# Patient Record
Sex: MALE | Race: WHITE | NOT HISPANIC OR LATINO | Employment: FULL TIME | URBAN - METROPOLITAN AREA
[De-identification: names, ages, dates, MRNs, and addresses within clinical notes are randomized per-mention and may not be internally consistent; named-entity substitution may affect disease eponyms.]

---

## 2018-01-10 ENCOUNTER — OFFICE VISIT (OUTPATIENT)
Dept: UROLOGY | Facility: CLINIC | Age: 48
End: 2018-01-10
Payer: COMMERCIAL

## 2018-01-10 VITALS
SYSTOLIC BLOOD PRESSURE: 155 MMHG | WEIGHT: 249 LBS | BODY MASS INDEX: 31.95 KG/M2 | HEART RATE: 67 BPM | DIASTOLIC BLOOD PRESSURE: 95 MMHG | HEIGHT: 74 IN

## 2018-01-10 DIAGNOSIS — I86.1 VARICOCELE: ICD-10-CM

## 2018-01-10 DIAGNOSIS — E29.1 TESTICULAR FAILURE: Primary | ICD-10-CM

## 2018-01-10 PROCEDURE — 99204 OFFICE O/P NEW MOD 45 MIN: CPT | Mod: S$GLB,,, | Performed by: UROLOGY

## 2018-01-10 PROCEDURE — 99999 PR PBB SHADOW E&M-EST. PATIENT-LVL III: CPT | Mod: PBBFAC,,, | Performed by: UROLOGY

## 2018-01-10 NOTE — PROGRESS NOTES
"Chief Complaint:  Infertility    HPI:    Mr. Burr is a 47 y.o.  male who just broke up with his girlfriend, but he now questions his fertility potential. 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 He denies a history of erectile dysfunction and ejaculatory problems.    SA 10/5/17--1.7 cc/15.5 million per cc/61%/2%    He has achieved 1 pregnancies in the past.    Jose Luis Burr denies a history of exposure to harmful toxins, and radiation.  Questionable chemical exposure.    No history of recent fevers greater than 101.5 degrees Farenheit.    + history of recent exposure to "wet heat." hot tub 1 month ago.    No history of urological trauma or testicular torsion.    No history of prostatitis, epididymitis, and orchitis.    No history of post-pubertal mumps.    There is no known family history of fertility problems.    REVIEW OF SYSTEMS:     He denies headache, blurred vision, fever, nausea, vomiting, chills, flank discomfort, abdominal pain, bleeding per rectum, cough, SOB, recent loss of consciousness, recent mental status changes, seizures, dizziness, or upper/lower extremity weakness.    PHYSICAL EXAM:     The patient generally appears in good health, is appropriately interactive, and is in no apparent distress.     Eyes: anicteric sclerae, moist conjunctivae; no lid-lag; PERRLA     HENT: Atraumatic; oropharynx clear with moist mucous membranes and no mucosal ulcerations;normal hard and soft palate.  No evidence of lymphadenopathy.    Neck: Trachea midline.  No thyromegaly.    Musculoskeletal: No abnormal gait.    Skin: No lesions.    Mental: Cooperative with normal affect.  Is oriented to time, place, and person.    Neuro: Grossly intact.    Chest: Normal inspiratory effort.   No accessory muscles.  No audible wheezes.  Respirations symmetric on inspiration and expiration.    Heart: Regular rhythm.      Abdomen:  Soft, non-tender. No masses or organomegaly. Bladder is not " "palpable. No evidence of flank discomfort. No evidence of inguinal hernia.    Genitourinary: Penis is normal with no evidence of plaques or induration. Urethral meatus is normal. Scrotum is normal. Testes are descended bilaterally with no evidence of abnormal masses or tenderness. Epididymis, vas deferens, and cord structures are normal bilaterally.  Testicular volume is approximately 20 cc on the R and 18 cc on the L.  He has a L grade II varicocele.    Extremities: No cyanosis, clubbing, or edema.    IMPRESSION & PLAN:    Mr. Burr is a 47 y.o.  male who just broke up with his girlfriend, but he now questions his fertility potential. 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 He denies a history of erectile dysfunction and ejaculatory problems.     10/5/17--1.7 cc/15.5 million per cc/61%/2%    L grade II varicocele.    1.  FSH, LH, testosterone, prolactin, and estradiol serum levels today.  2.  Semen analysis x 1 more.  3.  Return to the clinic in 3 weeks to discuss test results and treatment plan.  4.  Recommend avoiding "wet heat."  5.  Recommend taking a multivitamin and 500 mg of vitamin c daily in addition to the multivitamin.  6.  Please send a copy of the note to Dr. Tuttle.  Thank you for the consultation.  7. Discussed varicocele's potential impact on fertility.     CC: Marry    "

## 2018-01-10 NOTE — LETTER
January 10, 2018      Taty Tuttle MD  [address] [facility]  [address]  [facility] [address]           [facility] - Urology 4th Floor  [address]  [place] [address]  Phone: [phone]          Patient: Jose Luis Burr   MR Number: [id number]   YOB: 1970   Date of Visit: 1/10/2018       Dear Dr. Taty Tuttle:    Thank you for referring Jose Luis Burr to me for evaluation. Attached you will find relevant portions of my assessment and plan of care.    If you have questions, please do not hesitate to call me. I look forward to following Jose Luis Burr along with you.    Sincerely,    Diego Arnett MD    Enclosure  CC:  No Recipients    If you would like to receive this communication electronically, please contact [email or web address] or [phone] to request more information on [email or web address] Link access.    For providers and/or their staff who would like to refer a patient to [facility], please contact us through our one-stop-shop provider referral line, [facility], at [phone].    If you feel you have received this communication in error or would no longer like to receive these types of communications, please e-mail [email or web address]

## 2018-01-10 NOTE — PATIENT INSTRUCTIONS
VARICOCELE    A varicocele is a swelling in the veins above the testicles. It is similar to a varicose vein in the legs. The swelling occurs when too much blood collects in the veins. It most often occurs around the left testicle. A varicocele may cause bluish discoloration of the scrotum (the sac of skin covering the testicles).  In most cases, a varicocele is not serious and does not require treatment. However, it can contribute to infertility. If you and your partner are trying to conceive, talk to your doctor about your options.    TREATMENT  Medications  There are no medications that correct this condition.  Surgery  Surgery can be done to close off the enlarged veins. Varicocele is not serious. And all surgery has risks. So you and your doctor may consider surgery only if: .  · The testicle shrinks (atrophy).  · You want to try to improve your fertility.  FOLLOW UP as advised by the doctor or our staff. Schedule regular exams with your doctor so the varicocele can be monitored. Be sure to keep all your appointments. Tell your doctor if there are any changes in your testicles or scrotum.   GET PROMPT MEDICAL ATTENTION if any of the following occurs:  · Increasing pain in the scrotum  · Trouble urinating  · A lump in the testicle  · A bulge in the groin area appears or is enlarging  © [id number] [facility], [address] [place] [address], [place] [address]. All rights reserved. This information is not intended as a substitute for professional medical care. Always follow your healthcare professional's instructions.

## 2018-01-11 ENCOUNTER — LAB VISIT (OUTPATIENT)
Dept: LAB | Facility: HOSPITAL | Age: 48
End: 2018-01-11
Attending: UROLOGY
Payer: COMMERCIAL

## 2018-01-11 DIAGNOSIS — E29.1 TESTICULAR FAILURE: ICD-10-CM

## 2018-01-11 LAB
ESTRADIOL SERPL-MCNC: 25 PG/ML
FSH SERPL-ACNC: 3.4 MIU/ML
LH SERPL-ACNC: 3 MIU/ML
PROLACTIN SERPL IA-MCNC: 6.2 NG/ML
TESTOST SERPL-MCNC: 459 NG/DL

## 2018-01-11 PROCEDURE — 84403 ASSAY OF TOTAL TESTOSTERONE: CPT

## 2018-01-11 PROCEDURE — 82670 ASSAY OF TOTAL ESTRADIOL: CPT

## 2018-01-11 PROCEDURE — 83001 ASSAY OF GONADOTROPIN (FSH): CPT

## 2018-01-11 PROCEDURE — 84146 ASSAY OF PROLACTIN: CPT

## 2018-01-11 PROCEDURE — 83002 ASSAY OF GONADOTROPIN (LH): CPT

## 2018-01-11 PROCEDURE — 36415 COLL VENOUS BLD VENIPUNCTURE: CPT

## 2018-03-28 ENCOUNTER — OFFICE VISIT (OUTPATIENT)
Dept: UROLOGY | Facility: CLINIC | Age: 48
End: 2018-03-28
Payer: COMMERCIAL

## 2018-03-28 ENCOUNTER — TELEPHONE (OUTPATIENT)
Dept: UROLOGY | Facility: CLINIC | Age: 48
End: 2018-03-28

## 2018-03-28 ENCOUNTER — OFFICE VISIT (OUTPATIENT)
Dept: INTERNAL MEDICINE | Facility: CLINIC | Age: 48
End: 2018-03-28
Payer: COMMERCIAL

## 2018-03-28 VITALS
SYSTOLIC BLOOD PRESSURE: 140 MMHG | DIASTOLIC BLOOD PRESSURE: 94 MMHG | BODY MASS INDEX: 32.73 KG/M2 | WEIGHT: 255.06 LBS | HEART RATE: 66 BPM | HEIGHT: 74 IN

## 2018-03-28 VITALS
SYSTOLIC BLOOD PRESSURE: 145 MMHG | WEIGHT: 253.06 LBS | DIASTOLIC BLOOD PRESSURE: 81 MMHG | HEIGHT: 74 IN | BODY MASS INDEX: 32.48 KG/M2 | HEART RATE: 58 BPM

## 2018-03-28 DIAGNOSIS — K57.91 DIVERTICULOSIS OF INTESTINE WITH BLEEDING, UNSPECIFIED INTESTINAL TRACT LOCATION: Primary | ICD-10-CM

## 2018-03-28 DIAGNOSIS — I10 HYPERTENSION, UNSPECIFIED TYPE: ICD-10-CM

## 2018-03-28 DIAGNOSIS — I86.1 VARICOCELE: ICD-10-CM

## 2018-03-28 DIAGNOSIS — Z00.00 HEALTHCARE MAINTENANCE: ICD-10-CM

## 2018-03-28 DIAGNOSIS — E29.1 TESTICULAR FAILURE: Primary | ICD-10-CM

## 2018-03-28 DIAGNOSIS — I86.1 VARICOCELE: Primary | ICD-10-CM

## 2018-03-28 PROCEDURE — 3077F SYST BP >= 140 MM HG: CPT | Mod: CPTII,S$GLB,, | Performed by: STUDENT IN AN ORGANIZED HEALTH CARE EDUCATION/TRAINING PROGRAM

## 2018-03-28 PROCEDURE — 3078F DIAST BP <80 MM HG: CPT | Mod: CPTII,S$GLB,, | Performed by: STUDENT IN AN ORGANIZED HEALTH CARE EDUCATION/TRAINING PROGRAM

## 2018-03-28 PROCEDURE — 99999 PR PBB SHADOW E&M-EST. PATIENT-LVL III: CPT | Mod: PBBFAC,,, | Performed by: UROLOGY

## 2018-03-28 PROCEDURE — 99214 OFFICE O/P EST MOD 30 MIN: CPT | Mod: S$GLB,,, | Performed by: UROLOGY

## 2018-03-28 PROCEDURE — 99203 OFFICE O/P NEW LOW 30 MIN: CPT | Mod: S$GLB,,, | Performed by: STUDENT IN AN ORGANIZED HEALTH CARE EDUCATION/TRAINING PROGRAM

## 2018-03-28 PROCEDURE — 99999 PR PBB SHADOW E&M-EST. PATIENT-LVL III: CPT | Mod: PBBFAC,,, | Performed by: STUDENT IN AN ORGANIZED HEALTH CARE EDUCATION/TRAINING PROGRAM

## 2018-03-28 NOTE — PROGRESS NOTES
"Internal Medicine Clinic Note  03/28/2018    Subjective:       Patient ID: Jose Luis Burr is a 47 y.o. male being seen to establish care    Chief Complaint: Establish Care and Rectal Bleeding    HPI   Mr. Burr is a 46 y/o M who presents to establish care and address following problems:   1. Blood in stools - started this January. Pt reports intermittent episodes of bright red blood in stool. He denies melenic stools. Denies abdominal pain but has LLQ "discomfort" that can be exacerbated by particular foods, such as vinegar. Denies any NSAID use. Denies any weight loss, change in appetite, blood in urine etc.   He has a high fiber diet and denies straining with BM. Reports adequate 1-2 BM per day without significant change in consistency. He reports adding probiotics in his diet since last Saturday has helped. Pt believes these episodes are due to oregano oil ingestion in January.    2. Elevated BP - reports BP readings 130-140s at home. He eats well including a vegan diet with almond milk, lots of fruits and vegetables. Denies adherence to low-salt however. He used to exercise but has not in the last few months and has gained ~ 10 pounds.    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   HENT: Negative for congestion, hearing loss, sore throat and tinnitus.    Eyes: Negative for blurred vision and double vision.   Respiratory: Negative for cough, sputum production, shortness of breath and wheezing.    Cardiovascular: " "Negative for chest pain, palpitations, claudication and leg swelling.   Gastrointestinal: Positive for abdominal pain (LLQ discomfort) and blood in stool. Negative for constipation, diarrhea, heartburn, melena, nausea and vomiting.   Genitourinary: Negative for dysuria, frequency, hematuria and urgency.   Musculoskeletal: Negative for back pain, joint pain and myalgias.   Skin: Negative for rash.   Neurological: Negative for dizziness, tingling, weakness and headaches.   Psychiatric/Behavioral: Negative for depression. The patient is not nervous/anxious.      Objective:      BP (!) 140/94 (BP Location: Right arm, Patient Position: Sitting, BP Method: Large (Automatic))   Pulse 66   Ht 6' 2" (1.88 m)   Wt 115.7 kg (255 lb 1.2 oz)   BMI 32.75 kg/m²   Body mass index is 32.75 kg/m².    Physical Exam   Constitutional: He is oriented to person, place, and time. He appears well-developed and well-nourished.   HENT:   Head: Normocephalic and atraumatic.   Mouth/Throat: Oropharynx is clear and moist.   Eyes: EOM are normal. Pupils are equal, round, and reactive to light.   Neck: Normal range of motion. Neck supple.   Cardiovascular: Normal rate, regular rhythm, normal heart sounds and intact distal pulses.  Exam reveals no gallop and no friction rub.    No murmur heard.  Pulmonary/Chest: Effort normal and breath sounds normal. No respiratory distress. He has no wheezes. He has no rales. He exhibits no tenderness.   Abdominal: Soft. Bowel sounds are normal. He exhibits no distension. There is no tenderness (no TTP in any quadrants).   BAILEY negative for occult blood  No external hemorrhoids visible   Musculoskeletal: Normal range of motion.   Neurological: He is alert and oriented to person, place, and time.   Skin: Skin is warm and dry.   Psychiatric: He has a normal mood and affect.   Nursing note and vitals reviewed.      Assessment:         1. Diverticulosis of intestine with bleeding, unspecified intestinal tract " location  CBC auto differential    Comprehensive metabolic panel    Lipid panel    Case request GI: COLONOSCOPY    Ambulatory consult to Gastroenterology   2. Hypertension, unspecified type     3. Healthcare maintenance           Plan:         Jose Luis Burr is a 47 y.o. male being seen to establish care. He also complains of blood in stool. BAILEY was negative and there was no visible external hemorrhoids. Have high suspicion for diverticulosis given risk factors (sedentary, obesity, questionable family history (mom)) & associated LLQ discomfort.    1. Diverticulosis of intestine with bleeding, unspecified intestinal tract location  - CBC auto differential; Future  - Comprehensive metabolic panel; Future  - Case request GI: COLONOSCOPY  - Ambulatory consult to Gastroenterology  - Gave info on foods to avoid with diverticulosis (especially nuts, which pt eats a lot of!)    2. HTN  - DASH diet information given  - encouraged pt to implement lifestyle changes (diet, exercise and weight loss).   - will follow up in 3 months, and if BP still elevated, will start first-line agent    3. Healthcare maintenance  - Lipid panel; Future    RTC in 3 months. Address HTN    Patient seen and plan of care discussed with Dr. Damián Pereira MD  Internal Medicine, PGY-2  [phone]

## 2018-03-28 NOTE — PROGRESS NOTES
"Chief Complaint:  Infertility    HPI:    Mr. Burr is a 47 y.o.  male who just broke up with his girlfriend, but he now questions his fertility potential. Jose Luis Burr has undergone a semen analysis x 2 showing oligoteratospermia. He denies a history of erectile dysfunction and ejaculatory problems.    Lab Results   Component Value Date    TOTALTESTOST 459 01/11/2018    LABLH 3.0 01/11/2018    FSH 3.40 01/11/2018    ESTRADIOL 25 01/11/2018    PROLACTIN 6.2 01/11/2018      SA 10/5/17--1.7 cc/15.5 million per cc/61%/2%  SA 2/21/18--2.2 cc/6.5 million per cc/54%/1%    FOR REVIEW FROM PREVIOUS:    Mr. Burr is a 47 y.o.  male who just broke up with his girlfriend, but he now questions his fertility potential. 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 He denies a history of erectile dysfunction and ejaculatory problems.    SA 10/5/17--1.7 cc/15.5 million per cc/61%/2%    He has achieved 1 pregnancies in the past.    Jose Luis Burr denies a history of exposure to harmful toxins, and radiation.  Questionable chemical exposure.    No history of recent fevers greater than 101.5 degrees Farenheit.    + history of recent exposure to "wet heat." hot tub 1 month ago.    No history of urological trauma or testicular torsion.    No history of prostatitis, epididymitis, and orchitis.    No history of post-pubertal mumps.    There is no known family history of fertility problems.    REVIEW OF SYSTEMS:     He denies headache, blurred vision, fever, nausea, vomiting, chills, flank discomfort, abdominal pain, bleeding per rectum, cough, SOB, recent loss of consciousness, recent mental status changes, seizures, dizziness, or upper/lower extremity weakness.    PHYSICAL EXAM:     The patient generally appears in good health, is appropriately interactive, and is in no apparent distress.     Eyes: anicteric sclerae, moist conjunctivae; no lid-lag; PERRLA     HENT: Atraumatic; oropharynx clear with " moist mucous membranes and no mucosal ulcerations;normal hard and soft palate.  No evidence of lymphadenopathy.    Neck: Trachea midline.  No thyromegaly.    Musculoskeletal: No abnormal gait.    Skin: No lesions.    Mental: Cooperative with normal affect.  Is oriented to time, place, and person.    Neuro: Grossly intact.    Chest: Normal inspiratory effort.   No accessory muscles.  No audible wheezes.  Respirations symmetric on inspiration and expiration.    Heart: Regular rhythm.      Abdomen:  Soft, non-tender. No masses or organomegaly. Bladder is not palpable. No evidence of flank discomfort. No evidence of inguinal hernia.    Genitourinary: Penis is normal with no evidence of plaques or induration. Urethral meatus is normal. Scrotum is normal. Testes are descended bilaterally with no evidence of abnormal masses or tenderness. Epididymis, vas deferens, and cord structures are normal bilaterally.  Testicular volume is approximately 20 cc on the R and 18 cc on the L.  He has a L grade II varicocele.    Extremities: No cyanosis, clubbing, or edema.    IMPRESSION & PLAN:    Mr. Burr is a 47 y.o.  male who just broke up with his girlfriend, but he now questions his fertility potential. Jose Luis Burr has undergone a semen analysis x 2 showing oligoteratospermia. He denies a history of erectile dysfunction and ejaculatory problems.    Lab Results   Component Value Date    TOTALTESTOST 459 01/11/2018    LABLH 3.0 01/11/2018    FSH 3.40 01/11/2018    ESTRADIOL 25 01/11/2018    PROLACTIN 6.2 01/11/2018      SA 10/5/17--1.7 cc/15.5 million per cc/61%/2%  SA 2/21/18--2.2 cc/6.5 million per cc/54%/1%    L grade II varicocele.    1. Went over the results of his SA and hormonal panel today.  2. Discussed varicocele's potential impact on fertility.  Recommend correction. We discussed the risks and benefits of varicocele repair.  We specifically addressed the chance of failure to improve semen parameters, bleeding,  "infection, pain, loss of testicle, damage to the vas.  We discussed this amongst other risks and discussed alternatives.  He was given the chance to ask questions.  Will schedule for elective varicocele repair.    3.  Recommend avoiding "wet heat."  4.  Recommend taking a multivitamin and 500 mg of vitamin c daily in addition to the multivitamin.      CC:     "

## 2018-03-30 ENCOUNTER — PATIENT MESSAGE (OUTPATIENT)
Dept: INTERNAL MEDICINE | Facility: CLINIC | Age: 48
End: 2018-03-30

## 2018-04-03 DIAGNOSIS — Z12.11 SPECIAL SCREENING FOR MALIGNANT NEOPLASMS, COLON: Primary | ICD-10-CM

## 2018-04-03 RX ORDER — SODIUM, POTASSIUM,MAG SULFATES 17.5-3.13G
SOLUTION, RECONSTITUTED, ORAL ORAL
Qty: 1 BOTTLE | Refills: 0 | Status: ON HOLD | OUTPATIENT
Start: 2018-04-03 | End: 2018-04-09 | Stop reason: HOSPADM

## 2018-04-09 ENCOUNTER — SURGERY (OUTPATIENT)
Age: 48
End: 2018-04-09

## 2018-04-09 ENCOUNTER — HOSPITAL ENCOUNTER (OUTPATIENT)
Facility: HOSPITAL | Age: 48
Discharge: HOME | End: 2018-04-09
Attending: COLON & RECTAL SURGERY | Admitting: COLON & RECTAL SURGERY
Payer: COMMERCIAL

## 2018-04-09 ENCOUNTER — ANESTHESIA EVENT (OUTPATIENT)
Dept: ENDOSCOPY | Facility: HOSPITAL | Age: 48
End: 2018-04-09
Payer: COMMERCIAL

## 2018-04-09 ENCOUNTER — ANESTHESIA (OUTPATIENT)
Dept: ENDOSCOPY | Facility: HOSPITAL | Age: 48
End: 2018-04-09
Payer: COMMERCIAL

## 2018-04-09 VITALS
HEART RATE: 67 BPM | DIASTOLIC BLOOD PRESSURE: 85 MMHG | RESPIRATION RATE: 20 BRPM | WEIGHT: 245 LBS | BODY MASS INDEX: 31.44 KG/M2 | TEMPERATURE: 98 F | SYSTOLIC BLOOD PRESSURE: 144 MMHG | HEIGHT: 74 IN | OXYGEN SATURATION: 95 %

## 2018-04-09 DIAGNOSIS — Z12.11 SCREEN FOR COLON CANCER: ICD-10-CM

## 2018-04-09 PROCEDURE — 88305 TISSUE EXAM BY PATHOLOGIST: CPT | Performed by: PATHOLOGY

## 2018-04-09 PROCEDURE — D9220A PRA ANESTHESIA: Mod: CRNA,,, | Performed by: NURSE ANESTHETIST, CERTIFIED REGISTERED

## 2018-04-09 PROCEDURE — 63600175 PHARM REV CODE 636 W HCPCS: Performed by: NURSE ANESTHETIST, CERTIFIED REGISTERED

## 2018-04-09 PROCEDURE — 37000008 HC ANESTHESIA 1ST 15 MINUTES: Performed by: COLON & RECTAL SURGERY

## 2018-04-09 PROCEDURE — 45380 COLONOSCOPY AND BIOPSY: CPT | Performed by: COLON & RECTAL SURGERY

## 2018-04-09 PROCEDURE — 27201012 HC FORCEPS, HOT/COLD, DISP: Performed by: COLON & RECTAL SURGERY

## 2018-04-09 PROCEDURE — D9220A PRA ANESTHESIA: Mod: ANES,,, | Performed by: ANESTHESIOLOGY

## 2018-04-09 PROCEDURE — 37000009 HC ANESTHESIA EA ADD 15 MINS: Performed by: COLON & RECTAL SURGERY

## 2018-04-09 PROCEDURE — 45380 COLONOSCOPY AND BIOPSY: CPT | Mod: ,,, | Performed by: COLON & RECTAL SURGERY

## 2018-04-09 PROCEDURE — 25000003 PHARM REV CODE 250: Performed by: COLON & RECTAL SURGERY

## 2018-04-09 PROCEDURE — 88305 TISSUE EXAM BY PATHOLOGIST: CPT | Mod: 26,,, | Performed by: PATHOLOGY

## 2018-04-09 RX ORDER — PROPOFOL 10 MG/ML
VIAL (ML) INTRAVENOUS CONTINUOUS PRN
Status: DISCONTINUED | OUTPATIENT
Start: 2018-04-09 | End: 2018-04-09

## 2018-04-09 RX ORDER — LIDOCAINE HCL/PF 100 MG/5ML
SYRINGE (ML) INTRAVENOUS
Status: DISCONTINUED | OUTPATIENT
Start: 2018-04-09 | End: 2018-04-09

## 2018-04-09 RX ORDER — SODIUM CHLORIDE 9 MG/ML
INJECTION, SOLUTION INTRAVENOUS CONTINUOUS
Status: DISCONTINUED | OUTPATIENT
Start: 2018-04-09 | End: 2018-04-09 | Stop reason: HOSPADM

## 2018-04-09 RX ORDER — MESALAMINE 4 G/60ML
4 SUSPENSION RECTAL NIGHTLY
Qty: 1800 ML | Refills: 11 | Status: SHIPPED | OUTPATIENT
Start: 2018-04-09 | End: 2018-07-18

## 2018-04-09 RX ORDER — PROPOFOL 10 MG/ML
VIAL (ML) INTRAVENOUS
Status: DISCONTINUED | OUTPATIENT
Start: 2018-04-09 | End: 2018-04-09

## 2018-04-09 RX ADMIN — PROPOFOL 100 MG: 10 INJECTION, EMULSION INTRAVENOUS at 02:04

## 2018-04-09 RX ADMIN — SODIUM CHLORIDE: 9 INJECTION, SOLUTION INTRAVENOUS at 01:04

## 2018-04-09 RX ADMIN — LIDOCAINE HYDROCHLORIDE 40 MG: 20 INJECTION, SOLUTION INTRAVENOUS at 02:04

## 2018-04-09 RX ADMIN — PROPOFOL 150 MCG/KG/MIN: 10 INJECTION, EMULSION INTRAVENOUS at 02:04

## 2018-04-09 NOTE — TRANSFER OF CARE
"Anesthesia Transfer of Care Note    Patient: Jose Luis Burr    Procedure(s) Performed: Procedure(s) (LRB):  COLONOSCOPY (N/A)    Patient location: PACU    Anesthesia Type: general    Transport from OR: Transported from OR on 2-3 L/min O2 by NC with adequate spontaneous ventilation    Post pain: adequate analgesia    Post assessment: no apparent anesthetic complications    Post vital signs: stable    Level of consciousness: responds to stimulation and sedated    Nausea/Vomiting: no nausea/vomiting    Complications: none    Transfer of care protocol was followed      Last vitals:   Visit Vitals  BP (!) 169/96   Pulse 77   Temp 36.8 °C (98.2 °F)   Resp 14   Ht 6' 2" (1.88 m)   Wt 111.1 kg (245 lb)   SpO2 99%   BMI 31.46 kg/m²     "

## 2018-04-09 NOTE — PROVATION PATIENT INSTRUCTIONS
Discharge Summary/Instructions after an Endoscopic Procedure  Patient Name: Jose Luis Burr  Patient MRN: [id number]  Patient YOB: 1970 Monday, April 09, 2018  Morgan Hdz MD  RESTRICTIONS:  During your procedure today, you received medications for sedation.  These   medications may affect your judgment, balance and coordination.  Therefore,   for 24 hours, you have the following restrictions:   - DO NOT drive a car, operate machinery, make legal/financial decisions,   sign important papers or drink alcohol.    ACTIVITY:  The following day: return to full activity including work, except no heavy   lifting, straining or running for 3 days if polyps were removed.  DIET:  Eat and drink normally unless instructed otherwise.     TREATMENT FOR COMMON SIDE EFFECTS:  - Mild abdominal pain, nausea, belching, bloating or excessive gas:  rest,   eat lightly and use a heating pad.  - Sore Throat: treat with throat lozenges and/or gargle with warm salt   water.  - Because air was used during the procedure, expelling large amounts of air   from your rectum or belching is normal.  - If a bowel prep was taken, you may not have a bowel movement for 1-3 days.    This is normal.  SYMPTOMS TO WATCH FOR AND REPORT TO YOUR PHYSICIAN:  1. Abdominal pain or bloating, other than gas cramps.  2. Chest pain.  3. Back pain.  4. Signs of infection such as: chills or fever occurring within 24 hours   after the procedure.  5. Rectal bleeding, which would show as bright red, maroon, or black stools.   (A tablespoon of blood from the rectum is not serious, especially if   hemorrhoids are present.)  6. Vomiting.  7. Weakness or dizziness.  GO DIRECTLY TO THE NEAREST EMERGENCY ROOM IF YOU HAVE ANY OF THE FOLLOWING:      Difficulty breathing              Chills and/or fever over 101 F   Persistent vomiting and/or vomiting blood   Severe abdominal pain   Severe chest pain   Black, tarry stools   Bleeding- more than one tablespoon   Any other  symptom or condition that you feel may need urgent attention  Your doctor recommends these additional instructions:  If any biopsies were taken, your doctors clinic will contact you in 1 to 2   weeks with any results.  - Discharge patient to home (ambulatory).   - Patient has a contact number available for emergencies.  The signs and   symptoms of potential delayed complications were discussed with the   patient.  Return to normal activities tomorrow.  Written discharge   instructions were provided to the patient.   - Resume previous diet for the rest of the patient's life.   - Continue present medications.   - Use Rowasa enemas 1 per rectum daily for 6 weeks.   - Repeat colonoscopy in 3 - 5 years for surveillance.  For questions, problems or results please call your physician - Morgan Hdz MD at Work:  [phone].  [facility] [place], EMERGENCY ROOM PHONE NUMBER: [phone]  IF A COMPLICATION OR EMERGENCY SITUATION ARISES AND YOU ARE UNABLE TO REACH   YOUR PHYSICIAN - GO DIRECTLY TO THE EMERGENCY ROOM.  Morgan Hdz MD  4/9/2018 2:56:32 PM  This report has been verified and signed electronically.

## 2018-04-09 NOTE — DISCHARGE INSTRUCTIONS
Colonoscopy     A camera attached to a flexible tube with a viewing lens is used to take video pictures.     Colonoscopy is a test to view the inside of your lower digestive tract (colon and rectum). Sometimes it can show the last part of the small intestine (ileum). During the test, small pieces of tissue may be removed for testing. This is called a biopsy. Small growths, such as polyps, may also be removed.   Why is colonoscopy done?  The test is done to help look for colon cancer. And it can help find the source of abdominal pain, bleeding, and changes in bowel habits. It may be needed once a year, depending on factors such as your:  · Age  · Health history  · Family health history  · Symptoms  · Results from any prior colonoscopy  Risks and possible complications  These include:  · Bleeding               · A puncture or tear in the colon   · Risks of anesthesia  · A cancer lesion not being seen  Getting ready   To prepare for the test:  · Talk with your healthcare provider about the risks of the test (see below). Also ask your healthcare provider about alternatives to the test.  · Tell your healthcare provider about any medicines you take. Also tell him or her about any health conditions you may have.  · Make sure your rectum and colon are empty for the test. Follow the diet and bowel prep instructions exactly. If you dont, the test may need to be rescheduled.  · Plan for a friend or family member to drive you home after the test.     Colonoscopy provides an inside view of the entire colon.     You may discuss the results with your doctor right away or at a future visit.  During the test   The test is usually done in the hospital on an outpatient basis. This means you go home the same day. The procedure takes about 30 minutes. During that time:  · You are given relaxing (sedating) medicine through an IV line. You may be drowsy, or fully asleep.  · The healthcare provider will first give you a physical exam to  check for anal and rectal problems.  · Then the anus is lubricated and the scope inserted.  · If you are awake, you may have a feeling similar to needing to have a bowel movement. You may also feel pressure as air is pumped into the colon. Its OK to pass gas during the procedure.  · Biopsy, polyp removal, or other treatments may be done during the test.  After the test   You may have gas right after the test. It can help to try to pass it to help prevent later bloating. Your healthcare provider may discuss the results with you right away. Or you may need to schedule a follow-up visit to talk about the results. After the test, you can go back to your normal eating and other activities. You may be tired from the sedation and need to rest for a few hours.  Date Last Reviewed: 11/1/2016 © 2000-2017 [employer]. [address] [place] [address], [place] [address]. All rights reserved. This information is not intended as a substitute for professional medical care. Always follow your healthcare professional's instructions.

## 2018-04-09 NOTE — H&P
Endoscopy H&P    Procedure : Colonoscopy      rectal bleeding      Past Medical History:   Diagnosis Date    Hypertension              Review of Systems -ROS:  GENERAL: No fever, chills, fatigability or weight loss.  CHEST: Denies KILGORE, cyanosis, wheezing, cough and sputum production.  CARDIOVASCULAR: Denies chest pain, PND, orthopnea or reduced exercise tolerance.   Musculoskeletal ROS: negative for - gait disturbance or joint pain  Neurological ROS: negative for - confusion or memory loss        Physical Exam:  General: well developed, well nourished, no distress  Head: normocephalic  Neck: supple, symmetrical, trachea midline  Lungs:  clear to auscultation bilaterally and normal respiratory effort  Heart: regular rate and rhythm, S1, S2 normal, no murmur, rub or gallop and regular rate and rhythm  Abdomen: soft, non-tender non-distented; bowel sounds normal; no masses,  no organomegaly  Extremities: no cyanosis or edema, or clubbing       Moderate Sedation (choice): Mallampati Score 1    ASA : II    IMP: asymptomatic screening exam    Plan: Colonoscopy with Moderate sedation.  I have explained the procedure including indications, alternatives, expected outcomes and potential complications. The patient appears to understand and gives informed consent. The patient is medically ready for surgery.

## 2018-04-09 NOTE — ANESTHESIA PREPROCEDURE EVALUATION
04/09/2018  Jose Luis Burr is a 47 y.o., male.    Anesthesia Evaluation    I have reviewed the Patient Summary Reports.     I have reviewed the Medications.     Review of Systems  Anesthesia Hx:  No problems with previous Anesthesia    Cardiovascular:   Exercise tolerance: good Hypertension    Pulmonary:  Pulmonary Normal    Renal/:  Renal/ Normal     Hepatic/GI:  Hepatic/GI Normal Bowel Prep.    Neurological:  Neurology Normal    Endocrine:  Endocrine Normal        Physical Exam  General:  Well nourished    Airway/Jaw/Neck:  Airway Findings: Mouth Opening: Small, but > 3cm Tongue: Normal  General Airway Assessment: Adult  Mallampati: III  Improves to II with phonation.  TM Distance: Normal, at least 6 cm       Chest/Lungs:  Chest/Lungs Clear    Heart/Vascular:  Heart Findings: Normal            Anesthesia Plan  Type of Anesthesia, risks & benefits discussed:  Anesthesia Type:  general  Patient's Preference:   Intra-op Monitoring Plan: standard ASA monitors  Intra-op Monitoring Plan Comments:   Post Op Pain Control Plan: IV/PO Opioids PRN  Post Op Pain Control Plan Comments:   Induction:   IV  Beta Blocker:  Patient is not currently on a Beta-Blocker (No further documentation required).       Informed Consent: Patient understands risks and agrees with Anesthesia plan.  Questions answered. Anesthesia consent signed with patient.  ASA Score: 2     Day of Surgery Review of History & Physical:    H&P update referred to the surgeon.         Ready For Surgery From Anesthesia Perspective.

## 2018-04-09 NOTE — ANESTHESIA POSTPROCEDURE EVALUATION
"Anesthesia Post Evaluation    Patient: Jose Luis Burr    Procedure(s) Performed: Procedure(s) (LRB):  COLONOSCOPY (N/A)    Final Anesthesia Type: general  Patient location during evaluation: GI PACU  Patient participation: Yes- Able to Participate  Level of consciousness: awake and alert and oriented  Post-procedure vital signs: reviewed and stable  Pain management: adequate  Airway patency: patent  PONV status at discharge: No PONV  Anesthetic complications: no      Cardiovascular status: blood pressure returned to baseline  Respiratory status: unassisted, spontaneous ventilation and room air  Hydration status: euvolemic  Follow-up not needed.        Visit Vitals  /82   Pulse 72   Temp 36.7 °C (98.1 °F)   Resp 18   Ht 6' 2" (1.88 m)   Wt 111.1 kg (245 lb)   SpO2 96%   BMI 31.46 kg/m²       Pain/Segundo Score: Pain Assessment Performed: Yes (4/9/2018  3:00 PM)  Presence of Pain: denies (4/9/2018  3:00 PM)  Segundo Score: 8 (4/9/2018  3:00 PM)      "

## 2018-04-16 ENCOUNTER — TELEPHONE (OUTPATIENT)
Dept: ENDOSCOPY | Facility: HOSPITAL | Age: 48
End: 2018-04-16

## 2018-05-16 ENCOUNTER — OFFICE VISIT (OUTPATIENT)
Dept: SURGERY | Facility: CLINIC | Age: 48
End: 2018-05-16
Payer: COMMERCIAL

## 2018-05-16 VITALS
WEIGHT: 243.81 LBS | HEIGHT: 74 IN | BODY MASS INDEX: 31.29 KG/M2 | HEART RATE: 69 BPM | DIASTOLIC BLOOD PRESSURE: 78 MMHG | SYSTOLIC BLOOD PRESSURE: 130 MMHG

## 2018-05-16 DIAGNOSIS — K51.311 ULCERATIVE RECTOSIGMOIDITIS WITH RECTAL BLEEDING: Primary | ICD-10-CM

## 2018-05-16 PROCEDURE — 99999 PR PBB SHADOW E&M-EST. PATIENT-LVL III: CPT | Mod: PBBFAC,,, | Performed by: COLON & RECTAL SURGERY

## 2018-05-16 PROCEDURE — 3075F SYST BP GE 130 - 139MM HG: CPT | Mod: CPTII,S$GLB,, | Performed by: COLON & RECTAL SURGERY

## 2018-05-16 PROCEDURE — 3078F DIAST BP <80 MM HG: CPT | Mod: CPTII,S$GLB,, | Performed by: COLON & RECTAL SURGERY

## 2018-05-16 PROCEDURE — 3008F BODY MASS INDEX DOCD: CPT | Mod: CPTII,S$GLB,, | Performed by: COLON & RECTAL SURGERY

## 2018-05-16 PROCEDURE — 99213 OFFICE O/P EST LOW 20 MIN: CPT | Mod: S$GLB,,, | Performed by: COLON & RECTAL SURGERY

## 2018-05-21 NOTE — PROGRESS NOTES
HPI:  Jose Luis Burr is a 47 y.o. male with history of bloody mucoid stools who was found to have evidence of inflammation of the rectum and sigmoid colon.  He was started on mesalamine enemas and he reports that his symptoms pretty much have resolved.  He denies any abdominal pain or diarrhea.  He denies any further bleeding. Essentially he is asymptomatic.      Past Medical History:   Diagnosis Date    Hypertension         Past Surgical History:   Procedure Laterality Date    COLONOSCOPY N/A 4/9/2018    Procedure: COLONOSCOPY;  Surgeon: FRANKY Hdz MD;  Location: [address];  Service: Endoscopy;  Laterality: N/A;    TONSILLECTOMY         Review of patient's allergies indicates:  No Known Allergies    Family History   Problem Relation Age of Onset    Heart disease Father     Stroke Father     Heart disease Mother     Hypertension Sister        Social History     Social History    Marital status: Single     Spouse name: N/A    Number of children: N/A    Years of education: N/A     Occupational History     Other          Social History Main Topics    Smoking status: Former Smoker     Types: Cigarettes    Smokeless tobacco: Never Used    Alcohol use Yes      Comment: socially    Drug use: Yes     Types: Marijuana    Sexual activity: Yes     Partners: Female     Other Topics Concern    None     Social History Narrative    None       ROS:  GENERAL: No fever, chills, fatigability or weight loss.  Integument: No rashes, redness, icterus  CHEST: Denies KILGORE, cyanosis, wheezing, cough and sputum production.  CARDIOVASCULAR: Denies chest pain, PND, orthopnea or reduced exercise tolerance.  GI: Denies abd pain, dysphagia, nausea, vomiting, no hematemesis   : Denies burning on urination, no hematuria, no bacteriuria  MSK: No deformities, swelling, joint pain swelling  Neurologic: No HAs, seizures, weakness, paresthesias, gait problems    PE:  General appearance healthy  /78  "(BP Location: Right arm, Patient Position: Sitting, BP Method: Large (Automatic))   Pulse 69   Ht 6' 2" (1.88 m)   Wt 110.6 kg (243 lb 13.3 oz)   BMI 31.31 kg/m²   Sclera/ Skin anicteric  AT NC EOMI  Neck supple trachea midline, thyroid not enlarged  Chest symmetric, nl excursion, no retractions, breathing comfortably  EXT - no CCE  Neuro:  Mood/ affect nl, alert and oriented x 3, moves all ext's, gait nl    FINAL PATHOLOGIC DIAGNOSIS  1. TERMINAL ILEUM (BIOPSY):  Small intestinal mucosa with no significant histopathologic changes  2. COLON, ASCENDING (BIOPSY):  Colonic mucosa with no significant histopathologic changes  3. COLON, TRANSVERSE (BIOPSY):  Mild active colitis  4. COLON, DESCENDING (BIOPSY):  Colonic mucosa with no significant histopathologic changes  5. COLON, SIGMOID (BIOPSY):  Chronic colitis with mild activity  No granulomas, no dysplasia  6. RECTUM (BIOPSY):  Chronic proctitis with mild activity  No granulomas, no dysplasia    COMMENT: In the correct clinical setting and in the absence of a specific etiology, the histologic findings could  represent inflammatory bowel disease. Clinical correlation required.  Diagnosed by: Alix Wells M.D.  (Electronically Signed: 2018-04-18 12:35:45)      Assessment:  Ulcerative colitis, left-sided - sigmoid and rectum.  Evidence of microscopic disease and transverse colon  Clinically improved with resolution of bleeding and no mucus; bowel habits improved    Plan:  I discussed the pathologic findings with the patient and we discussed the nature of colitis in detail.  I have recommended that he follow up with Gastroenterology for further education and treatment as well as follow-up.  Complete course of mesalamine enemas  Follow-up with gastroenterology Dr. Vern Mathew    "

## 2018-06-19 ENCOUNTER — OFFICE VISIT (OUTPATIENT)
Dept: UROLOGY | Facility: CLINIC | Age: 48
End: 2018-06-19
Payer: COMMERCIAL

## 2018-06-19 ENCOUNTER — ANESTHESIA EVENT (OUTPATIENT)
Dept: SURGERY | Facility: HOSPITAL | Age: 48
End: 2018-06-19
Payer: COMMERCIAL

## 2018-06-19 DIAGNOSIS — I86.1 LEFT VARICOCELE: Primary | ICD-10-CM

## 2018-06-19 PROCEDURE — 99999 PR PBB SHADOW E&M-EST. PATIENT-LVL II: CPT | Mod: PBBFAC,,,

## 2018-06-19 PROCEDURE — 99499 UNLISTED E&M SERVICE: CPT | Mod: S$GLB,,, | Performed by: UROLOGY

## 2018-06-19 RX ORDER — MULTIVITAMIN
1 TABLET ORAL DAILY
COMMUNITY

## 2018-06-19 NOTE — ANESTHESIA PREPROCEDURE EVALUATION
Pre Admission Screening  Marychuy Alberto RN      []Hide copied text  Anesthesia Assessment: Preoperative EQUATION     Planned Procedure: Procedure(s) (LRB):  VARICOCELECTOMY (Left)  Requested Anesthesia Type:General  Surgeon: Diego Arnett MD  Service: Urology  Known or anticipated Date of Surgery:6/26/2018     Surgeon notes: reviewed     Electronic QUestionnaire Assessment completed via nurse interview with patient.         NO AQ        Triage considerations:      The patient has no apparent active cardiac condition (No unstable coronary Syndrome such as severe unstable angina or recent [<1 month] myocardial infarction, decompensated CHF, severe valvular   disease or significant arrhythmia)     Previous anesthesia records:GETA, MAC and No problems 4/9/18 colonoscopy  Airway/Jaw/Neck:  Airway Findings: Mouth Opening: Small, but > 3cm Tongue: Normal  General Airway Assessment: Adult  Mallampati: III  Improves to II with phonation.  TM Distance: Normal, at least 6 cm        Last PCP note: within 3 months , within [facility]   Subspecialty notes: Gastroenterology     Other important co-morbidities: Diverticulosis, obesity, HX HTN, former smoker     Tests already available:  Available tests,  within 3 months . 3/28/18 CBC and CMP.                            Instructions given. (See in Nurse's note)     Optimization:  Anesthesia Preop Clinic Assessment  Indicated-not required for this procedure                Plan:    Testing:  none                           Patient  has previously scheduled Medical Appointment:preop with surgeon 6/19     Navigation:                            Straight Line to surgery.                          No tests, anesthesia preop clinic visit, or consult required.                                 Electronically signed by Marychuy Alberto RN at 6/19/2018 11:34 AM        Pre-admit on 6/26/2018            Detailed Report                                                                                                                     06/19/2018  Jose Luis Burr is a 47 y.o., male.  Past Medical History:   Diagnosis Date    Hypertension      Past Surgical History:   Procedure Laterality Date    COLONOSCOPY N/A 4/9/2018    Procedure: COLONOSCOPY;  Surgeon: FRANKY Hdz MD;  Location: [address];  Service: Endoscopy;  Laterality: N/A;    TONSILLECTOMY       Patient Active Problem List   Diagnosis    Testicular failure    Varicocele    Hypertension    Healthcare maintenance    Screen for colon cancer     2D Echo:  No results found for this or any previous visit.    Please See ROS/PMH and Active Problem List above    Anesthesia Evaluation    I have reviewed the Patient Summary Reports.    I have reviewed the Nursing Notes.   I have reviewed the Medications.     Review of Systems  Anesthesia Hx:  No problems with previous Anesthesia  History of prior surgery of interest to airway management or planning: Previous anesthesia: MAC  4/2018 colonoscopy with MAC.  Procedure performed at an [facility]. Denies Family Hx of Anesthesia complications.   Denies Personal Hx of Anesthesia complications.   Social:  Tobacco Use: Former smoker Illicit Drug Use: Types of drugs include Marijuana,   Hematology/Oncology:  Hematology Normal   Oncology Normal     EENT/Dental:EENT/Dental Normal   Cardiovascular:    Denies Angina. Pt denies HTN, was told he would need to start meds or lose weight-he lost 15lbs and BP stable. Functional Capacity good / => 4 METS   Denies Hypertension.    Pulmonary:   Denies Shortness of breath.  Denies Recent URI.  Possible Obstructive Sleep Apnea , (STOP/BANG) Symptoms S - Snoring (loud) and G - Gender (Male > Female)    Renal/:  Other Renal / Gu Conditions: Varicocele  Hepatic/GI:  Bowel Conditions:  Inflammatory Bowel Disease, Ulcerative Colitis (diverticulosis)    Musculoskeletal:  Musculoskeletal Normal    Neurological:  Neurology Normal    Endocrine:  Metabolic Disorders,  Obesity / BMI > 30  Psych:  Psychiatric Normal           Physical Exam  General:  Well nourished    Airway/Jaw/Neck:  Airway Findings: Mouth Opening: Small, but > 3cm Tongue: Normal  General Airway Assessment: Adult  Mallampati: III  Improves to II with phonation.  TM Distance: Normal, at least 6 cm       Chest/Lungs:  Chest/Lungs Findings: Clear to auscultation, Normal Respiratory Rate     Heart/Vascular:  Heart Findings: Rate: Normal  Rhythm: Regular Rhythm             Anesthesia Plan  Type of Anesthesia, risks & benefits discussed:  Anesthesia Type:  general  Patient's Preference: gen  Intra-op Monitoring Plan: standard ASA monitors  Intra-op Monitoring Plan Comments:   Post Op Pain Control Plan:   Post Op Pain Control Plan Comments: Iv>po  Induction:   IV  Beta Blocker:  Patient is not currently on a Beta-Blocker (No further documentation required).       Informed Consent: Patient understands risks and agrees with Anesthesia plan.  Questions answered. Anesthesia consent signed with patient.  ASA Score: 2     Day of Surgery Review of History & Physical:    H&P update referred to the surgeon.     Anesthesia Plan Notes: Consider LMA        Ready For Surgery From Anesthesia Perspective.

## 2018-06-19 NOTE — PRE ADMISSION SCREENING
Anesthesia Assessment: Preoperative EQUATION    Planned Procedure: Procedure(s) (LRB):  VARICOCELECTOMY (Left)  Requested Anesthesia Type:General  Surgeon: Diego Arnett MD  Service: Urology  Known or anticipated Date of Surgery:6/26/2018    Surgeon notes: reviewed    Electronic QUestionnaire Assessment completed via nurse interview with patient.        NO AQ      Triage considerations:     The patient has no apparent active cardiac condition (No unstable coronary Syndrome such as severe unstable angina or recent [<1 month] myocardial infarction, decompensated CHF, severe valvular   disease or significant arrhythmia)    Previous anesthesia records:GETA, MAC and No problems 4/9/18 colonoscopy  Airway/Jaw/Neck:  Airway Findings: Mouth Opening: Small, but > 3cm Tongue: Normal  General Airway Assessment: Adult  Mallampati: III  Improves to II with phonation.  TM Distance: Normal, at least 6 cm       Last PCP note: within 3 months , within [facility]   Subspecialty notes: Gastroenterology    Other important co-morbidities: Diverticulosis, obesity, HX HTN, former smoker     Tests already available:  Available tests,  within 3 months . 3/28/18 CBC and CMP.            Instructions given. (See in Nurse's note)    Optimization:  Anesthesia Preop Clinic Assessment  Indicated-not required for this procedure      Plan:    Testing:  none      Patient  has previously scheduled Medical Appointment:preop with surgeon 6/19    Navigation:                  Straight Line to surgery.               No tests, anesthesia preop clinic visit, or consult required.

## 2018-06-19 NOTE — PROGRESS NOTES
Urology ([facility]) H&P  Staff: Diego Arnett MD    Is this patient in a research study?  No    CC: infertility    HPI:  Jose Luis Burr is a 47 y.o. male with a recently discovered left varicocele on fertility workup. He is not currently in a relationship however has questioned his fertility potential. He would like to get back together with his girlfriend and therefore would like his fertility issues addressed.  He has achieved 1 pregnancy in the past, his daughter is now 21 years old. He has no hx of exposure to harmful toxins or radiation. He denies recent exposure to wet heat, fevers, testicular trauma or torsion, hx of prostatitis, epididymitis or post-pubertal mumps. He has no issues with erections or ejaculation. His hormone levels were within normal limits. He underwent 2 semen analysis which showed:    10/5/17--1.7 cc/15.5 million per cc/61%/2%  2/21/18--2.2 cc/6.5 million per cc/54%/1%    He is without complaints today. No voiding issues. He has been taking a multivitamin.     ROS:  Neg except per HPI    Past Medical History:   Diagnosis Date    Hypertension        Past Surgical History:   Procedure Laterality Date    COLONOSCOPY N/A 4/9/2018    Procedure: COLONOSCOPY;  Surgeon: FRANKY Hdz MD;  Location: [address];  Service: Endoscopy;  Laterality: N/A;    TONSILLECTOMY         Social History     Social History    Marital status: Single     Spouse name: N/A    Number of children: N/A    Years of education: N/A     Occupational History     Other          Social History Main Topics    Smoking status: Former Smoker     Types: Cigarettes    Smokeless tobacco: Never Used    Alcohol use Yes      Comment: socially    Drug use: Yes     Types: Marijuana    Sexual activity: Yes     Partners: Female     Other Topics Concern    None     Social History Narrative    None       Family History   Problem Relation Age of Onset    Heart disease Father     Stroke Father      Heart disease Mother     Hypertension Sister        Review of patient's allergies indicates:  No Known Allergies    Current Outpatient Prescriptions on File Prior to Visit   Medication Sig Dispense Refill    Lactobac no.41/Bifidobact no.7 (PROBIOTIC-10 ORAL) Take by mouth.      mesalamine (ROWASA) 4 gram/60 mL Enem Place 60 mLs (4 g total) rectally every evening. 1800 mL 11    multivitamin (ONE DAILY MULTIVITAMIN) per tablet Take 1 tablet by mouth once daily.      naproxen sodium (ALEVE ORAL) Take by mouth.       No current facility-administered medications on file prior to visit.        Anticoagulation:  No    Physical Exam:  weight 243 lb/110 kg    AAOx4, NAD, WDWN  NC/AT, EOMI, PER, sclerae anicteric, speech normal, tongue midline  Nl effort, CTAB  RRR  Soft, non-tender, non-distended  Penis circumcised and without lesions. Bilateral testicles normal in size and non-tender with no masses. There is a left grade 2 varicocele, right cord structure normal.     Labs:    Urine dipstick today - negative for blood, leuks, nitrite    Lab Results   Component Value Date    WBC 5.88 03/28/2018    HGB 15.1 03/28/2018    HCT 44.3 03/28/2018    MCV 91 03/28/2018     03/28/2018       BMP  Lab Results   Component Value Date     03/28/2018    K 4.2 03/28/2018     03/28/2018    CO2 22 (L) 03/28/2018    BUN 17 03/28/2018    CREATININE 1.0 03/28/2018    CALCIUM 9.3 03/28/2018    ANIONGAP 9 03/28/2018    ESTGFRAFRICA >60.0 03/28/2018    EGFRNONAA >60.0 03/28/2018       Imaging:  none    Assessment: Jose Luis Burr is a 47 y.o. male with left varicocele and oligoteratospermia    Plan:     1. To OR on 6/26/18 for left varicocelectomy   2. Consents signed   3. I have explained the risk, benefits, and alternatives of the procedure in detail. The patient voices understanding and all questions have been answered. The patient agrees to proceed as planned.        Jessica Combs MD

## 2018-06-25 ENCOUNTER — TELEPHONE (OUTPATIENT)
Dept: UROLOGY | Facility: CLINIC | Age: 48
End: 2018-06-25

## 2018-06-25 NOTE — TELEPHONE ENCOUNTER
Called pt to confirm 8:00am  arrival time for procedure. Gave pt NPO instructions and gave pt opportunity to ask questions. Pt verbalized understanding.

## 2018-06-26 ENCOUNTER — ANESTHESIA (OUTPATIENT)
Dept: SURGERY | Facility: HOSPITAL | Age: 48
End: 2018-06-26
Payer: COMMERCIAL

## 2018-06-26 ENCOUNTER — SURGERY (OUTPATIENT)
Age: 48
End: 2018-06-26

## 2018-06-26 ENCOUNTER — NURSE TRIAGE (OUTPATIENT)
Dept: ADMINISTRATIVE | Facility: CLINIC | Age: 48
End: 2018-06-26

## 2018-06-26 ENCOUNTER — HOSPITAL ENCOUNTER (OUTPATIENT)
Facility: HOSPITAL | Age: 48
Discharge: HOME | End: 2018-06-26
Attending: UROLOGY | Admitting: UROLOGY
Payer: COMMERCIAL

## 2018-06-26 VITALS
HEART RATE: 55 BPM | OXYGEN SATURATION: 97 % | HEIGHT: 74 IN | DIASTOLIC BLOOD PRESSURE: 88 MMHG | TEMPERATURE: 98 F | BODY MASS INDEX: 30.16 KG/M2 | WEIGHT: 235 LBS | RESPIRATION RATE: 16 BRPM | SYSTOLIC BLOOD PRESSURE: 148 MMHG

## 2018-06-26 DIAGNOSIS — I86.1 LEFT VARICOCELE: ICD-10-CM

## 2018-06-26 PROCEDURE — 71000039 HC RECOVERY, EACH ADD'L HOUR: Performed by: UROLOGY

## 2018-06-26 PROCEDURE — 71000033 HC RECOVERY, INTIAL HOUR: Performed by: UROLOGY

## 2018-06-26 PROCEDURE — 37000009 HC ANESTHESIA EA ADD 15 MINS: Performed by: UROLOGY

## 2018-06-26 PROCEDURE — 63600175 PHARM REV CODE 636 W HCPCS: Performed by: ANESTHESIOLOGY

## 2018-06-26 PROCEDURE — D9220A PRA ANESTHESIA: Mod: CRNA,,, | Performed by: NURSE ANESTHETIST, CERTIFIED REGISTERED

## 2018-06-26 PROCEDURE — 94761 N-INVAS EAR/PLS OXIMETRY MLT: CPT

## 2018-06-26 PROCEDURE — 55530 REVISE SPERMATIC CORD VEINS: CPT | Mod: LT,,, | Performed by: UROLOGY

## 2018-06-26 PROCEDURE — 37000008 HC ANESTHESIA 1ST 15 MINUTES: Performed by: UROLOGY

## 2018-06-26 PROCEDURE — 76998 US GUIDE INTRAOP: CPT | Mod: 26,,, | Performed by: UROLOGY

## 2018-06-26 PROCEDURE — 36000706: Performed by: UROLOGY

## 2018-06-26 PROCEDURE — 25000003 PHARM REV CODE 250: Performed by: STUDENT IN AN ORGANIZED HEALTH CARE EDUCATION/TRAINING PROGRAM

## 2018-06-26 PROCEDURE — 25000003 PHARM REV CODE 250: Performed by: UROLOGY

## 2018-06-26 PROCEDURE — 63600175 PHARM REV CODE 636 W HCPCS: Performed by: NURSE ANESTHETIST, CERTIFIED REGISTERED

## 2018-06-26 PROCEDURE — 25000003 PHARM REV CODE 250: Performed by: NURSE ANESTHETIST, CERTIFIED REGISTERED

## 2018-06-26 PROCEDURE — 63600175 PHARM REV CODE 636 W HCPCS: Performed by: STUDENT IN AN ORGANIZED HEALTH CARE EDUCATION/TRAINING PROGRAM

## 2018-06-26 PROCEDURE — 36000707: Performed by: UROLOGY

## 2018-06-26 PROCEDURE — 71000015 HC POSTOP RECOV 1ST HR: Performed by: UROLOGY

## 2018-06-26 PROCEDURE — D9220A PRA ANESTHESIA: Mod: ANES,,, | Performed by: ANESTHESIOLOGY

## 2018-06-26 RX ORDER — HYDROCODONE BITARTRATE AND ACETAMINOPHEN 5; 325 MG/1; MG/1
1 TABLET ORAL EVERY 4 HOURS PRN
Status: DISCONTINUED | OUTPATIENT
Start: 2018-06-26 | End: 2018-06-26 | Stop reason: HOSPADM

## 2018-06-26 RX ORDER — HYDROMORPHONE HYDROCHLORIDE 1 MG/ML
0.2 INJECTION, SOLUTION INTRAMUSCULAR; INTRAVENOUS; SUBCUTANEOUS EVERY 5 MIN PRN
Status: COMPLETED | OUTPATIENT
Start: 2018-06-26 | End: 2018-06-26

## 2018-06-26 RX ORDER — LIDOCAINE HYDROCHLORIDE 10 MG/ML
1 INJECTION, SOLUTION EPIDURAL; INFILTRATION; INTRACAUDAL; PERINEURAL ONCE
Status: COMPLETED | OUTPATIENT
Start: 2018-06-26 | End: 2018-06-26

## 2018-06-26 RX ORDER — ONDANSETRON 8 MG/1
8 TABLET, ORALLY DISINTEGRATING ORAL EVERY 8 HOURS PRN
Status: DISCONTINUED | OUTPATIENT
Start: 2018-06-26 | End: 2018-06-26 | Stop reason: HOSPADM

## 2018-06-26 RX ORDER — MIDAZOLAM HYDROCHLORIDE 1 MG/ML
INJECTION, SOLUTION INTRAMUSCULAR; INTRAVENOUS
Status: DISCONTINUED | OUTPATIENT
Start: 2018-06-26 | End: 2018-06-26

## 2018-06-26 RX ORDER — ONDANSETRON 2 MG/ML
4 INJECTION INTRAMUSCULAR; INTRAVENOUS DAILY PRN
Status: DISCONTINUED | OUTPATIENT
Start: 2018-06-26 | End: 2018-06-26 | Stop reason: HOSPADM

## 2018-06-26 RX ORDER — HYDROMORPHONE HYDROCHLORIDE 1 MG/ML
INJECTION, SOLUTION INTRAMUSCULAR; INTRAVENOUS; SUBCUTANEOUS
Status: DISCONTINUED
Start: 2018-06-26 | End: 2018-06-26 | Stop reason: WASHOUT

## 2018-06-26 RX ORDER — NEOSTIGMINE METHYLSULFATE 1 MG/ML
INJECTION, SOLUTION INTRAVENOUS
Status: DISCONTINUED | OUTPATIENT
Start: 2018-06-26 | End: 2018-06-26

## 2018-06-26 RX ORDER — LIDOCAINE HYDROCHLORIDE 10 MG/ML
INJECTION, SOLUTION EPIDURAL; INFILTRATION; INTRACAUDAL; PERINEURAL
Status: DISCONTINUED | OUTPATIENT
Start: 2018-06-26 | End: 2018-06-26 | Stop reason: HOSPADM

## 2018-06-26 RX ORDER — ONDANSETRON 2 MG/ML
INJECTION INTRAMUSCULAR; INTRAVENOUS
Status: DISCONTINUED | OUTPATIENT
Start: 2018-06-26 | End: 2018-06-26

## 2018-06-26 RX ORDER — FENTANYL CITRATE 50 UG/ML
INJECTION, SOLUTION INTRAMUSCULAR; INTRAVENOUS
Status: DISCONTINUED | OUTPATIENT
Start: 2018-06-26 | End: 2018-06-26

## 2018-06-26 RX ORDER — SODIUM CHLORIDE 9 MG/ML
INJECTION, SOLUTION INTRAVENOUS CONTINUOUS
Status: DISCONTINUED | OUTPATIENT
Start: 2018-06-26 | End: 2018-06-26 | Stop reason: HOSPADM

## 2018-06-26 RX ORDER — LIDOCAINE HCL/PF 100 MG/5ML
SYRINGE (ML) INTRAVENOUS
Status: DISCONTINUED | OUTPATIENT
Start: 2018-06-26 | End: 2018-06-26

## 2018-06-26 RX ORDER — OXYCODONE AND ACETAMINOPHEN 5; 325 MG/1; MG/1
1 TABLET ORAL EVERY 4 HOURS PRN
Qty: 7 TABLET | Refills: 0 | Status: SHIPPED | OUTPATIENT
Start: 2018-06-26 | End: 2018-07-18

## 2018-06-26 RX ORDER — GLYCOPYRROLATE 0.2 MG/ML
INJECTION INTRAMUSCULAR; INTRAVENOUS
Status: DISCONTINUED | OUTPATIENT
Start: 2018-06-26 | End: 2018-06-26

## 2018-06-26 RX ORDER — CEFAZOLIN SODIUM 1 G/3ML
2 INJECTION, POWDER, FOR SOLUTION INTRAMUSCULAR; INTRAVENOUS
Status: COMPLETED | OUTPATIENT
Start: 2018-06-26 | End: 2018-06-26

## 2018-06-26 RX ORDER — PROPOFOL 10 MG/ML
VIAL (ML) INTRAVENOUS
Status: DISCONTINUED | OUTPATIENT
Start: 2018-06-26 | End: 2018-06-26

## 2018-06-26 RX ORDER — ROCURONIUM BROMIDE 10 MG/ML
INJECTION, SOLUTION INTRAVENOUS
Status: DISCONTINUED | OUTPATIENT
Start: 2018-06-26 | End: 2018-06-26

## 2018-06-26 RX ADMIN — HYDROMORPHONE HYDROCHLORIDE 0.2 MG: 1 INJECTION, SOLUTION INTRAMUSCULAR; INTRAVENOUS; SUBCUTANEOUS at 12:06

## 2018-06-26 RX ADMIN — FENTANYL CITRATE 100 MCG: 50 INJECTION, SOLUTION INTRAMUSCULAR; INTRAVENOUS at 09:06

## 2018-06-26 RX ADMIN — SODIUM CHLORIDE: 0.9 INJECTION, SOLUTION INTRAVENOUS at 09:06

## 2018-06-26 RX ADMIN — MIDAZOLAM HYDROCHLORIDE 2 MG: 1 INJECTION, SOLUTION INTRAMUSCULAR; INTRAVENOUS at 09:06

## 2018-06-26 RX ADMIN — LIDOCAINE HYDROCHLORIDE 1 MG: 10 INJECTION, SOLUTION EPIDURAL; INFILTRATION; INTRACAUDAL; PERINEURAL at 09:06

## 2018-06-26 RX ADMIN — HYDROCODONE BITARTRATE AND ACETAMINOPHEN 1 TABLET: 5; 325 TABLET ORAL at 12:06

## 2018-06-26 RX ADMIN — ONDANSETRON 4 MG: 2 INJECTION INTRAMUSCULAR; INTRAVENOUS at 11:06

## 2018-06-26 RX ADMIN — HYDROMORPHONE HYDROCHLORIDE 0.2 MG: 1 INJECTION, SOLUTION INTRAMUSCULAR; INTRAVENOUS; SUBCUTANEOUS at 11:06

## 2018-06-26 RX ADMIN — CEFAZOLIN 2 G: 330 INJECTION, POWDER, FOR SOLUTION INTRAMUSCULAR; INTRAVENOUS at 10:06

## 2018-06-26 RX ADMIN — ROCURONIUM BROMIDE 50 MG: 10 INJECTION, SOLUTION INTRAVENOUS at 09:06

## 2018-06-26 RX ADMIN — LIDOCAINE HYDROCHLORIDE 20 ML: 10 INJECTION, SOLUTION EPIDURAL; INFILTRATION; INTRACAUDAL; PERINEURAL at 09:06

## 2018-06-26 RX ADMIN — LIDOCAINE HYDROCHLORIDE 70 MG: 20 INJECTION, SOLUTION INTRAVENOUS at 09:06

## 2018-06-26 RX ADMIN — GLYCOPYRROLATE 0.2 MG: 0.2 INJECTION, SOLUTION INTRAMUSCULAR; INTRAVENOUS at 11:06

## 2018-06-26 RX ADMIN — NEOSTIGMINE METHYLSULFATE 2 MG: 1 INJECTION INTRAVENOUS at 11:06

## 2018-06-26 RX ADMIN — FENTANYL CITRATE 50 MCG: 50 INJECTION, SOLUTION INTRAMUSCULAR; INTRAVENOUS at 10:06

## 2018-06-26 RX ADMIN — PROPOFOL 200 MG: 10 INJECTION, EMULSION INTRAVENOUS at 09:06

## 2018-06-26 NOTE — OP NOTE
[facility]  Operative Note    Date: 06/26/2018    Pre-Op Diagnosis: left varicocele    Post-Op Diagnosis: same    Procedure(s) Performed:   left microscopic varicocelectomy  Intraoperative ultrasound    Specimen(s): none    Staff Surgeon: Diego Arnett MD    Assistant Surgeon: Jessica Combs MD    Anesthesia: General endotracheal anesthesia    Indications: Jose Luis Burr is a 47 y.o. male with oligoteratospermia and a left varicocele. After discussion with the patient, he has elected to undergo surgical correction of his varicocele.      Findings: left grade 2 varicocele    Estimated Blood Loss: <5mL    Drains: none    Procedure in Detail:  After informed consent was obtained, the patient was transferred to the operating room and placed in the supine position.  Anesthesia was administered. He was shaved, prepped and draped in the usual sterile fashion. A marking pen was used to alli the skin over the subinguinal area over the left spermatic cord. This alli was incised with a 15 blade, creating a transverse skin incision along Blake's lines measuring approximately 3 cm.  A hemostat was used to elevate the subcutaneous tissues.  Bovie cautery was used to dissect down through Camper's and Jose's fascia. Blunt dissection was then used to dissect down to the fascia. The spermatic cord was identified and with blunt dissection was freed circumferentially. Cremasteric fibers were released from the cord structures. A penrose drain was placed underneath the spermatic cord, and the drain was secured to the drapes in both directions using hemostats.     The operating Leica microscope was brought into the operative field.  The vas deferens was identified.  The surgeon's hand was used to compress the vas deferens and provide traction throughout the procedure.  Ranjeet forceps were used to dissect away the layers of the spermatic cord fascia.  In this fashion, the internal spermatic veins were identified  and dissected using Paul hemostats and Ranjeet forceps. The veins were ligated using 3-0 silk ties. The testicular artery was identified visually as well as with Doppler ultrasound. It was preserved throughout the case and verified each time before tying off a vein. Vasal vessels and lymphatics were also preserved. Careful inspection of the cord showed no other veins requiring ligation.    Hemostasis was confirmed, the penrose was removed, and the cord was placed back into its normal anatomic position. The incision was irrigated with normal saline. 3-0 Vicryl suture was used in a running fashion to close the deep dermal layer. 4-0 monocryl running subcuticular suture was used to close the skin. Sterile dressing was applied using steri strips and island dressing. The patient was awakened and transferred to the recovery room in stable condition.    Post Operative Plan  The patient will be discharged home today.  He is to refrain from any heavy lifting or exercise for 2 weeks time.  He will follow up with Dr. Arnett in 4 weeks time.    Jessica Combs MD

## 2018-06-26 NOTE — ANESTHESIA RELEASE NOTE
"Anesthesia Release from PACU Note    Anesthesia Post Evaluation    Patient: Jose Luis Burr    Procedure(s) Performed: Procedure(s) (LRB):  VARICOCELECTOMY (Left)    Final Anesthesia Type: general  Patient location during evaluation: PACU  Patient participation: Yes- Able to Participate  Level of consciousness: awake and alert  Post-procedure vital signs: reviewed and stable  Pain management: adequate  Airway patency: patent  PONV status at discharge: No PONV  Anesthetic complications: no      Cardiovascular status: blood pressure returned to baseline  Respiratory status: unassisted  Hydration status: euvolemic  Follow-up not needed.        Visit Vitals  BP (!) 107/56 (BP Location: Right arm, Patient Position: Lying)   Pulse (!) 56   Temp 36.5 °C (97.7 °F) (Temporal)   Resp (!) 21   Ht 6' 2" (1.88 m)   Wt 106.6 kg (235 lb)   SpO2 100%   BMI 30.17 kg/m²       Pain/Segundo Score: Pain Assessment Performed: Yes (6/26/2018 11:39 AM)  Presence of Pain: non-verbal indicators absent (6/26/2018 11:39 AM)  Pain Rating Prior to Med Admin: 5 (6/26/2018 12:25 PM)  Segundo Score: 9 (6/26/2018 11:39 AM)    "

## 2018-06-26 NOTE — H&P (VIEW-ONLY)
Urology ([facility]) H&P  Staff: Diego Arnett MD    Is this patient in a research study?  No    CC: infertility    HPI:  Jose Luis Burr is a 47 y.o. male with a recently discovered left varicocele on fertility workup. He is not currently in a relationship however has questioned his fertility potential. He would like to get back together with his girlfriend and therefore would like his fertility issues addressed.  He has achieved 1 pregnancy in the past, his daughter is now 21 years old. He has no hx of exposure to harmful toxins or radiation. He denies recent exposure to wet heat, fevers, testicular trauma or torsion, hx of prostatitis, epididymitis or post-pubertal mumps. He has no issues with erections or ejaculation. His hormone levels were within normal limits. He underwent 2 semen analysis which showed:    10/5/17--1.7 cc/15.5 million per cc/61%/2%  2/21/18--2.2 cc/6.5 million per cc/54%/1%    He is without complaints today. No voiding issues. He has been taking a multivitamin.     ROS:  Neg except per HPI    Past Medical History:   Diagnosis Date    Hypertension        Past Surgical History:   Procedure Laterality Date    COLONOSCOPY N/A 4/9/2018    Procedure: COLONOSCOPY;  Surgeon: FRANKY Hdz MD;  Location: [address];  Service: Endoscopy;  Laterality: N/A;    TONSILLECTOMY         Social History     Social History    Marital status: Single     Spouse name: N/A    Number of children: N/A    Years of education: N/A     Occupational History     Other          Social History Main Topics    Smoking status: Former Smoker     Types: Cigarettes    Smokeless tobacco: Never Used    Alcohol use Yes      Comment: socially    Drug use: Yes     Types: Marijuana    Sexual activity: Yes     Partners: Female     Other Topics Concern    None     Social History Narrative    None       Family History   Problem Relation Age of Onset    Heart disease Father     Stroke Father      Heart disease Mother     Hypertension Sister        Review of patient's allergies indicates:  No Known Allergies    Current Outpatient Prescriptions on File Prior to Visit   Medication Sig Dispense Refill    Lactobac no.41/Bifidobact no.7 (PROBIOTIC-10 ORAL) Take by mouth.      mesalamine (ROWASA) 4 gram/60 mL Enem Place 60 mLs (4 g total) rectally every evening. 1800 mL 11    multivitamin (ONE DAILY MULTIVITAMIN) per tablet Take 1 tablet by mouth once daily.      naproxen sodium (ALEVE ORAL) Take by mouth.       No current facility-administered medications on file prior to visit.        Anticoagulation:  No    Physical Exam:  weight 243 lb/110 kg    AAOx4, NAD, WDWN  NC/AT, EOMI, PER, sclerae anicteric, speech normal, tongue midline  Nl effort, CTAB  RRR  Soft, non-tender, non-distended  Penis circumcised and without lesions. Bilateral testicles normal in size and non-tender with no masses. There is a left grade 2 varicocele, right cord structure normal.     Labs:    Urine dipstick today - negative for blood, leuks, nitrite    Lab Results   Component Value Date    WBC 5.88 03/28/2018    HGB 15.1 03/28/2018    HCT 44.3 03/28/2018    MCV 91 03/28/2018     03/28/2018       BMP  Lab Results   Component Value Date     03/28/2018    K 4.2 03/28/2018     03/28/2018    CO2 22 (L) 03/28/2018    BUN 17 03/28/2018    CREATININE 1.0 03/28/2018    CALCIUM 9.3 03/28/2018    ANIONGAP 9 03/28/2018    ESTGFRAFRICA >60.0 03/28/2018    EGFRNONAA >60.0 03/28/2018       Imaging:  none    Assessment: Jose Luis Burr is a 47 y.o. male with left varicocele and oligoteratospermia    Plan:     1. To OR on 6/26/18 for left varicocelectomy   2. Consents signed   3. I have explained the risk, benefits, and alternatives of the procedure in detail. The patient voices understanding and all questions have been answered. The patient agrees to proceed as planned.        Jessica Combs MD

## 2018-06-26 NOTE — DISCHARGE INSTRUCTIONS
Postoperative Instructions  No heavy lifting greater than 10 pounds or a gallon of milk  Do not strain to have a bowel movement  No strenuous exercise  You may sponge bathe  No driving while you are on narcotic pain medications   No showering until 48 hours after surgery    Call the doctor if:   Temperature is greater than 101F   Persistent vomiting and inability to keep food down   Inability to pee

## 2018-06-26 NOTE — TRANSFER OF CARE
"Anesthesia Transfer of Care Note    Patient: Jose Luis Burr    Procedure(s) Performed: Procedure(s) (LRB):  VARICOCELECTOMY (Left)    Patient location: PACU    Anesthesia Type: general    Transport from OR: Transported from OR on room air with adequate spontaneous ventilation    Post pain: adequate analgesia    Post assessment: no apparent anesthetic complications and tolerated procedure well    Post vital signs: stable    Level of consciousness: awake, alert and oriented    Nausea/Vomiting: no nausea/vomiting    Complications: none    Transfer of care protocol was followed      Last vitals:   Visit Vitals  /83 (BP Location: Right arm, Patient Position: Lying)   Pulse (!) 57   Temp 36.8 °C (98.2 °F) (Oral)   Resp 16   Ht 6' 2" (1.88 m)   Wt 106.6 kg (235 lb)   SpO2 100%   BMI 30.17 kg/m²     "

## 2018-06-26 NOTE — ANESTHESIA POSTPROCEDURE EVALUATION
"Anesthesia Post Evaluation    Patient: Jose Luis Burr    Procedure(s) Performed: Procedure(s) (LRB):  VARICOCELECTOMY (Left)    Final Anesthesia Type: general  Patient location during evaluation: PACU  Patient participation: Yes- Able to Participate  Level of consciousness: awake and alert  Post-procedure vital signs: reviewed and stable  Pain management: adequate  Airway patency: patent  PONV status at discharge: No PONV  Anesthetic complications: no      Cardiovascular status: blood pressure returned to baseline  Respiratory status: unassisted  Hydration status: euvolemic  Follow-up not needed.        Visit Vitals  BP (!) 107/56 (BP Location: Right arm, Patient Position: Lying)   Pulse (!) 56   Temp 36.5 °C (97.7 °F) (Temporal)   Resp (!) 21   Ht 6' 2" (1.88 m)   Wt 106.6 kg (235 lb)   SpO2 100%   BMI 30.17 kg/m²       Pain/Segundo Score: Pain Assessment Performed: Yes (6/26/2018 11:39 AM)  Presence of Pain: non-verbal indicators absent (6/26/2018 11:39 AM)  Pain Rating Prior to Med Admin: 5 (6/26/2018 12:25 PM)  Segundo Score: 9 (6/26/2018 11:39 AM)      "

## 2018-06-26 NOTE — DISCHARGE SUMMARY
[facility]  Discharge Note  Short Stay    Admit Date: 6/26/2018    Discharge Date and Time: 06/26/2018 9:50 AM      Attending Physician: Diego Arnett MD     Discharge Provider: Jessica Combs    Diagnoses:  Active Hospital Problems    Diagnosis  POA    *Left varicocele [I86.1]  Yes    Hypertension [I10]  Yes    Testicular failure [E29.1]  Yes      Resolved Hospital Problems    Diagnosis Date Resolved POA   No resolved problems to display.       Discharged Condition: good    Hospital Course: Patient was admitted for left varicocelectomy and tolerated the procedure well with no complications. The patient was discharged home in good condition on the same day.       Final Diagnoses: Same as principal problem.    Disposition: Home or Self Care    Follow up/Patient Instructions:    Medications:  Reconciled Home Medications: Current Discharge Medication List      START taking these medications    Details   oxyCODONE-acetaminophen (PERCOCET) 5-325 mg per tablet Take 1 tablet by mouth every 4 (four) hours as needed.  Qty: 7 tablet, Refills: 0         CONTINUE these medications which have NOT CHANGED    Details   Lactobac no.41/Bifidobact no.7 (PROBIOTIC-10 ORAL) Take by mouth.      multivitamin (ONE DAILY MULTIVITAMIN) per tablet Take 1 tablet by mouth once daily.      naproxen sodium (ALEVE ORAL) Take by mouth.      mesalamine (ROWASA) 4 gram/60 mL Enem Place 60 mLs (4 g total) rectally every evening.  Qty: 1800 mL, Refills: 11             Discharge Procedure Orders  Diet general     Call MD for:  temperature >100.4     Call MD for:  persistent nausea and vomiting     Call MD for:  severe uncontrolled pain     Remove dressing in 48 hours     Call MD for:  redness, tenderness, or signs of infection (pain, swelling, redness, odor or green/yellow discharge around incision site)       Follow-up Information     Diego Arnett MD In 4 weeks.    Specialty:  Urology  Why:  post op  Contact information:  [address]  [facility] [address]  [phone]                     Jessica Combs MD  Urology, PGY-3  Pager# [phone]

## 2018-06-27 NOTE — TELEPHONE ENCOUNTER
Reason for Disposition   Caller has URGENT question and triager unable to answer question    Protocols used: ST POST-OP SYMPTOMS AND [id number]    Francis reporting some purple colored area from beneath the head of penis to the base of the shaft post-varicocelectomy today. He denies increasing pain to this area. He is still urinating normally. He has some mild decrease in sensation but still has feeling to the area. Spoke to on call provider who advised it may be some bruising from small blood vessels that were cut during surgery. He advised it may look a little worse in color over the next 24 hours but should get better after that. Advised patient.

## 2018-07-02 PROBLEM — Z00.00 HEALTHCARE MAINTENANCE: Status: RESOLVED | Noted: 2018-03-28 | Resolved: 2018-07-02

## 2018-07-18 ENCOUNTER — OFFICE VISIT (OUTPATIENT)
Dept: GASTROENTEROLOGY | Facility: CLINIC | Age: 48
End: 2018-07-18
Payer: COMMERCIAL

## 2018-07-18 VITALS
WEIGHT: 234.38 LBS | BODY MASS INDEX: 30.08 KG/M2 | HEIGHT: 74 IN | SYSTOLIC BLOOD PRESSURE: 128 MMHG | DIASTOLIC BLOOD PRESSURE: 84 MMHG | HEART RATE: 61 BPM

## 2018-07-18 DIAGNOSIS — K51.311 ULCERATIVE RECTOSIGMOIDITIS WITH RECTAL BLEEDING: Primary | ICD-10-CM

## 2018-07-18 PROCEDURE — 99204 OFFICE O/P NEW MOD 45 MIN: CPT | Mod: S$GLB,,, | Performed by: INTERNAL MEDICINE

## 2018-07-18 PROCEDURE — 3074F SYST BP LT 130 MM HG: CPT | Mod: CPTII,S$GLB,, | Performed by: INTERNAL MEDICINE

## 2018-07-18 PROCEDURE — 3008F BODY MASS INDEX DOCD: CPT | Mod: CPTII,S$GLB,, | Performed by: INTERNAL MEDICINE

## 2018-07-18 PROCEDURE — 3079F DIAST BP 80-89 MM HG: CPT | Mod: CPTII,S$GLB,, | Performed by: INTERNAL MEDICINE

## 2018-07-18 PROCEDURE — 99999 PR PBB SHADOW E&M-EST. PATIENT-LVL III: CPT | Mod: PBBFAC,,, | Performed by: INTERNAL MEDICINE

## 2018-07-18 RX ORDER — MESALAMINE 1.2 G/1
4.8 TABLET, DELAYED RELEASE ORAL
Qty: 120 TABLET | Refills: 3 | Status: SHIPPED | OUTPATIENT
Start: 2018-07-18 | End: 2018-07-25

## 2018-07-18 NOTE — PROGRESS NOTES
REASON FOR VISIT:  Recent diagnosis of ulcerative colitis.    HISTORY OF PRESENT ILLNESS:  Mr. Burr is a 47-year-old who was in her usual   state of health until the beginning of 2018 when he started noticing loosening   of the bowels and some rectal bleeding associated with his stool.  He would   often notice some blood after a bowel movement.  He denied any fevers, chills,   nausea, vomiting, no nocturnal diarrhea or incontinence.  He did not have any   significant abdominal pain issues.  He subsequently underwent colonoscopy in   April, which revealed mild inflammation in the rectosigmoid, which on biopsies   revealed chronic inflammation suggestive of inflammatory bowel disease.  He   denies any family history of inflammatory bowel disease, denies any recent NSAID   use or antibiotic use.  He was prescribed Rowasa, which he took for close to a   month with about 75% improvement.  Subsequently, he went to see Chinese   acupuncture where they have been given him some herbal supplements, which he   feels like has helped him the most.  Today, we discussed about trial of   mesalamine by mouth 4.8 g dosage to see if we can put him into clinical   remission and we also discussed about adding Rowasa if his symptoms are not well   controlled on just oral medication.  He denies any extraintestinal   manifestations, i.e., no eye symptoms.  No skin issues.  No joint inflammation.    PAST MEDICAL, SURGICAL, SOCIAL AND FAMILY HISTORY:  Reviewed.    MEDICATIONS:  Reviewed.    REVIEW OF SYSTEMS:  CONSTITUTIONAL:  No fever, no chills, no weight loss.  Appetite is normal.  EYES:  No visual changes, no red eyes.  ENT:  No odynophagia or hoarseness of voice.  CARDIOVASCULAR:  No angina or palpitation.  RESPIRATORY:  No shortness of breath or wheezing or cough.  GENITOURINARY:  No dysuria or frequency.  MUSCULOSKELETAL:  No myalgia, no arthralgia.  SKIN:  No pruritus or eczema.  NEUROLOGIC:  No headache, no seizures.  PSYCHIATRIC:   No anxiety or depression.  GASTROINTESTINAL:  See HPI.    PHYSICAL EXAMINATION:  VITAL SIGNS:  See Epic.  GENERAL:  Awake, alert and oriented x3, no acute distress.  NECK:  Supple, no carotid bruit.  No cervical adenopathy.  ABDOMEN:  Slightly obese, soft, nondistended, nontender.  No masses palpable.    No hepatosplenomegaly appreciated.  Bowel sounds are normal.  No abdominal   bruits heard.  EYES:  Conjunctivae anicteric.  ENT:  Oropharynx, mucosa moist.  CARDIOVASCULAR:  S1, S2 normal.  RESPIRATORY:  Bilateral air entry equal.  SKIN:  No palmar erythema or spider angiomata.  NEUROLOGIC:  No asterixis.  PSYCHIATRY:  Affect appropriate.  LOWER EXTREMITY:  No pedal edema.    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    RECOMMENDATION:  1.  Start mesalamine (Lialda 4.8 g daily).  2.  Check CBC, CMP and CRP today.  3.  Follow up with Ms. Larisa Pressley in about six weeks.  We will repeat BMP   in four weeks.  If the patient is clinically doing well, we will continue the   current dose.  If not, we will add Rowasa enemas nightly for four weeks in   addition to Lialda and follow up with me thereafter.      ACT/HN  dd: 07/18/2018 15:07:28 (CDT)  td: 07/19/2018 00:50:32 (CDT)  Doc ID   #[id number]  Job ID #[id number]    CC:

## 2018-07-18 NOTE — LETTER
July 18, 2018      Heather Pereira MD  [address]  [place] [address]           [facility] - Gastroenterology  [address]  [place] [address]  Phone: [phone]  Fax: [phone]          Patient: Jose Luis Burr   MR Number: [id number]   YOB: 1970   Date of Visit: 7/18/2018       Dear Dr. Heather Pereira:    Thank you for referring Jose Luis Burr to me for evaluation. Attached you will find relevant portions of my assessment and plan of care.    If you have questions, please do not hesitate to call me. I look forward to following Jose Luis Burr along with you.    Sincerely,    Tayo Suárez MD    Enclosure  CC:  No Recipients    If you would like to receive this communication electronically, please contact [email or web address] or [phone] to request more information on [facility] [employer] [facility] [facility] access.    For providers and/or their staff who would like to refer a patient to [facility], please contact us through our one-stop-shop provider referral line, [facility], at [phone].    If you feel you have received this communication in error or would no longer like to receive these types of communications, please e-mail [email or web address]

## 2018-07-25 ENCOUNTER — OFFICE VISIT (OUTPATIENT)
Dept: UROLOGY | Facility: CLINIC | Age: 48
End: 2018-07-25
Payer: COMMERCIAL

## 2018-07-25 VITALS — WEIGHT: 234 LBS | HEIGHT: 74 IN | BODY MASS INDEX: 30.03 KG/M2

## 2018-07-25 DIAGNOSIS — I86.1 VARICOCELE: Primary | ICD-10-CM

## 2018-07-25 PROBLEM — Z12.11 SCREEN FOR COLON CANCER: Status: RESOLVED | Noted: 2018-04-09 | Resolved: 2018-07-25

## 2018-07-25 PROCEDURE — 99999 PR PBB SHADOW E&M-EST. PATIENT-LVL III: CPT | Mod: PBBFAC,,, | Performed by: UROLOGY

## 2018-07-25 PROCEDURE — 99024 POSTOP FOLLOW-UP VISIT: CPT | Mod: S$GLB,,, | Performed by: UROLOGY

## 2018-07-25 NOTE — PROGRESS NOTES
"Chief Complaint:  Infertility    HPI:    Mr. Burr is a 47 y.o.  male who just broke up with his girlfriend, but he now questions his fertility potential. Jose Luis Burr has undergone a semen analysis x 2 showing oligoteratospermia. He denies a history of erectile dysfunction and ejaculatory problems.  He's s/p a L varicocelectomy on 6/23/18    Lab Results   Component Value Date    TOTALTESTOST 459 01/11/2018    LABLH 3.0 01/11/2018    FSH 3.40 01/11/2018    ESTRADIOL 25 01/11/2018    PROLACTIN 6.2 01/11/2018      SA 10/5/17--1.7 cc/15.5 million per cc/61%/2%  SA 2/21/18--2.2 cc/6.5 million per cc/54%/1%    FOR REVIEW FROM PREVIOUS:    Mr. Burr is a 47 y.o.  male who just broke up with his girlfriend, but he now questions his fertility potential. Jose Luis Burr has undergone a semen analysis x 2 showing oligoteratospermia. He denies a history of erectile dysfunction and ejaculatory problems.      Lab Results   Component Value Date    TOTALTESTOST 459 01/11/2018    LABLH 3.0 01/11/2018    FSH 3.40 01/11/2018    ESTRADIOL 25 01/11/2018    PROLACTIN 6.2 01/11/2018      SA 10/5/17--1.7 cc/15.5 million per cc/61%/2%  SA 2/21/18--2.2 cc/6.5 million per cc/54%/1%    He has achieved 1 pregnancies in the past.    Jose Luis Burr denies a history of exposure to harmful toxins, and radiation.  Questionable chemical exposure.    No history of recent fevers greater than 101.5 degrees Farenheit.    + history of recent exposure to "wet heat." hot tub 1 month ago.    No history of urological trauma or testicular torsion.    No history of prostatitis, epididymitis, and orchitis.    No history of post-pubertal mumps.    There is no known family history of fertility problems.    REVIEW OF SYSTEMS:     He denies headache, blurred vision, fever, nausea, vomiting, chills, flank discomfort, abdominal pain, bleeding per rectum, cough, SOB, recent loss of consciousness, recent mental status changes, seizures, dizziness, or upper/lower " extremity weakness.    PHYSICAL EXAM:     The patient generally appears in good health, is appropriately interactive, and is in no apparent distress.     Eyes: anicteric sclerae, moist conjunctivae; no lid-lag; PERRLA     HENT: Atraumatic; oropharynx clear with moist mucous membranes and no mucosal ulcerations;normal hard and soft palate.  No evidence of lymphadenopathy.    Neck: Trachea midline.  No thyromegaly.    Musculoskeletal: No abnormal gait.    Skin: No lesions.    Mental: Cooperative with normal affect.  Is oriented to time, place, and person.    Neuro: Grossly intact.    Chest: Normal inspiratory effort.   No accessory muscles.  No audible wheezes.  Respirations symmetric on inspiration and expiration.    Heart: Regular rhythm.      Abdomen:  Soft, non-tender. No masses or organomegaly. Bladder is not palpable. No evidence of flank discomfort. No evidence of inguinal hernia.    Genitourinary: Penis is normal with no evidence of plaques or induration. Urethral meatus is normal. Scrotum is normal. Testes are descended bilaterally with no evidence of abnormal masses or tenderness. Epididymis, vas deferens, and cord structures are normal bilaterally.  Testicular volume is approximately 20 cc on the R and 18 cc on the L.      Extremities: No cyanosis, clubbing, or edema.    IMPRESSION & PLAN:    Mr. Burr is a 47 y.o.  male who just broke up with his girlfriend, but he now questions his fertility potential. Jose Luis Burr has undergone a semen analysis x 2 showing oligoteratospermia. He denies a history of erectile dysfunction and ejaculatory problems.  He's s/p a L varicocelectomy on 6/23/18    Lab Results   Component Value Date    TOTALTESTOST 459 01/11/2018    LABLH 3.0 01/11/2018    FSH 3.40 01/11/2018    ESTRADIOL 25 01/11/2018    PROLACTIN 6.2 01/11/2018      SA 10/5/17--1.7 cc/15.5 million per cc/61%/2%  SA 2/21/18--2.2 cc/6.5 million per cc/54%/1%    1. RTC 3 months with another SA.  2.  Recommend  "avoiding "wet heat."  3.  Recommend taking a multivitamin and 500 mg of vitamin c daily in addition to the multivitamin.      CC:     "

## 2018-10-16 ENCOUNTER — PATIENT MESSAGE (OUTPATIENT)
Dept: UROLOGY | Facility: CLINIC | Age: 48
End: 2018-10-16

## 2018-10-29 ENCOUNTER — OFFICE VISIT (OUTPATIENT)
Dept: UROLOGY | Facility: CLINIC | Age: 48
End: 2018-10-29
Payer: COMMERCIAL

## 2018-10-29 VITALS
BODY MASS INDEX: 29.71 KG/M2 | SYSTOLIC BLOOD PRESSURE: 125 MMHG | HEIGHT: 74 IN | HEART RATE: 57 BPM | DIASTOLIC BLOOD PRESSURE: 74 MMHG | WEIGHT: 231.5 LBS

## 2018-10-29 DIAGNOSIS — E29.1 TESTICULAR FAILURE: Primary | ICD-10-CM

## 2018-10-29 PROCEDURE — 3008F BODY MASS INDEX DOCD: CPT | Mod: CPTII,S$GLB,, | Performed by: UROLOGY

## 2018-10-29 PROCEDURE — 99999 PR PBB SHADOW E&M-EST. PATIENT-LVL III: CPT | Mod: PBBFAC,,, | Performed by: UROLOGY

## 2018-10-29 PROCEDURE — 3078F DIAST BP <80 MM HG: CPT | Mod: CPTII,S$GLB,, | Performed by: UROLOGY

## 2018-10-29 PROCEDURE — 99214 OFFICE O/P EST MOD 30 MIN: CPT | Mod: S$GLB,,, | Performed by: UROLOGY

## 2018-10-29 PROCEDURE — 3074F SYST BP LT 130 MM HG: CPT | Mod: CPTII,S$GLB,, | Performed by: UROLOGY

## 2018-10-29 NOTE — PROGRESS NOTES
Chief Complaint:  Infertility    HPI:    Mr. Burr is a 48 y.o.  male who just broke up with his girlfriend, but he now questions his fertility potential. Jose Luis Burr has undergone a semen analysis x 2 showing oligoteratospermia. He denies a history of erectile dysfunction and ejaculatory problems.  He's s/p a L varicocelectomy on 6/23/18.  Post correction SA shows an isolated morphology defect.    Lab Results   Component Value Date    TOTALTESTOST 459 01/11/2018    LABLH 3.0 01/11/2018    FSH 3.40 01/11/2018    ESTRADIOL 25 01/11/2018    PROLACTIN 6.2 01/11/2018      SA 10/5/17--1.7 cc/15.5 million per cc/61%/2%  SA 2/21/18--2.2 cc/6.5 million per cc/54%/1%  SA 10/24/18--1.9 cc/31 million per cc/61%/5%    FOR REVIEW FROM PREVIOUS:    Mr. Burr is a 48 y.o.  male who just broke up with his girlfriend, but he now questions his fertility potential. Jose Luis Burr has undergone a semen analysis x 2 showing oligoteratospermia. He denies a history of erectile dysfunction and ejaculatory problems.  He's s/p a L varicocelectomy on 6/23/18    Lab Results   Component Value Date    TOTALTESTOST 459 01/11/2018    LABLH 3.0 01/11/2018    FSH 3.40 01/11/2018    ESTRADIOL 25 01/11/2018    PROLACTIN 6.2 01/11/2018      SA 10/5/17--1.7 cc/15.5 million per cc/61%/2%  SA 2/21/18--2.2 cc/6.5 million per cc/54%/1%    He has achieved 1 pregnancies in the past.    REVIEW OF SYSTEMS:     He denies headache, blurred vision, fever, nausea, vomiting, chills, flank discomfort, abdominal pain, bleeding per rectum, cough, SOB, recent loss of consciousness, recent mental status changes, seizures, dizziness, or upper/lower extremity weakness.    PHYSICAL EXAM:     The patient generally appears in good health, is appropriately interactive, and is in no apparent distress.     Eyes: anicteric sclerae, moist conjunctivae; no lid-lag; PERRLA     HENT: Atraumatic; oropharynx clear with moist mucous membranes and no mucosal ulcerations;normal hard  "and soft palate.  No evidence of lymphadenopathy.    Neck: Trachea midline.  No thyromegaly.    Musculoskeletal: No abnormal gait.    Skin: No lesions.    Mental: Cooperative with normal affect.  Is oriented to time, place, and person.    Neuro: Grossly intact.    Chest: Normal inspiratory effort.   No accessory muscles.  No audible wheezes.  Respirations symmetric on inspiration and expiration.    Heart: Regular rhythm.      Abdomen:  Soft, non-tender. No masses or organomegaly. Bladder is not palpable. No evidence of flank discomfort. No evidence of inguinal hernia.    Genitourinary: Penis is normal with no evidence of plaques or induration. Urethral meatus is normal. Scrotum is normal. Testes are descended bilaterally with no evidence of abnormal masses or tenderness. Epididymis, vas deferens, and cord structures are normal bilaterally.  Testicular volume is approximately 20 cc on the R and 18 cc on the L.      Extremities: No cyanosis, clubbing, or edema.    IMPRESSION & PLAN:    Mr. Burr is a 48 y.o.  male who just broke up with his girlfriend, but he now questions his fertility potential. Jose Luis Burr has undergone a semen analysis x 2 showing oligoteratospermia. He denies a history of erectile dysfunction and ejaculatory problems.  He's s/p a L varicocelectomy on 6/23/18.  Post correction SA shows an isolated morphology defect.    Lab Results   Component Value Date    TOTALTESTOST 459 01/11/2018    LABLH 3.0 01/11/2018    FSH 3.40 01/11/2018    ESTRADIOL 25 01/11/2018    PROLACTIN 6.2 01/11/2018      SA 10/5/17--1.7 cc/15.5 million per cc/61%/2%  SA 2/21/18--2.2 cc/6.5 million per cc/54%/1%  SA 10/24/18--1.9 cc/31 million per cc/61%/5%    1.  Went over the results of his SA and hormonal panel today.  2. From a male factor perspective efforts with natural means, IUI, and IVF are all appropriate.  3.  Recommend avoiding "wet heat."  4.  Recommend taking a multivitamin and 500 mg of vitamin c daily in " addition to the multivitamin.  5. RTC prn  6. I spent 25 minutes with the patient of which more than half was spent in direct consultation with the patient in regards to our treatment and plan.         CC:

## 2019-08-27 ENCOUNTER — OFFICE VISIT (OUTPATIENT)
Dept: INTERNAL MEDICINE | Facility: CLINIC | Age: 49
End: 2019-08-27
Payer: COMMERCIAL

## 2019-08-27 VITALS — DIASTOLIC BLOOD PRESSURE: 86 MMHG | SYSTOLIC BLOOD PRESSURE: 128 MMHG

## 2019-08-27 DIAGNOSIS — Z00.00 ENCOUNTER FOR ANNUAL PHYSICAL EXAM: Primary | ICD-10-CM

## 2019-08-27 DIAGNOSIS — I10 ESSENTIAL HYPERTENSION: ICD-10-CM

## 2019-08-27 DIAGNOSIS — K52.9 INFLAMMATORY BOWEL DISEASES (IBD): ICD-10-CM

## 2019-08-27 DIAGNOSIS — Z86.39: ICD-10-CM

## 2019-08-27 PROCEDURE — 3079F DIAST BP 80-89 MM HG: CPT | Mod: CPTII,S$GLB,, | Performed by: STUDENT IN AN ORGANIZED HEALTH CARE EDUCATION/TRAINING PROGRAM

## 2019-08-27 PROCEDURE — 3079F PR MOST RECENT DIASTOLIC BLOOD PRESSURE 80-89 MM HG: ICD-10-PCS | Mod: CPTII,S$GLB,, | Performed by: STUDENT IN AN ORGANIZED HEALTH CARE EDUCATION/TRAINING PROGRAM

## 2019-08-27 PROCEDURE — 3074F SYST BP LT 130 MM HG: CPT | Mod: CPTII,S$GLB,, | Performed by: STUDENT IN AN ORGANIZED HEALTH CARE EDUCATION/TRAINING PROGRAM

## 2019-08-27 PROCEDURE — 99999 PR PBB SHADOW E&M-EST. PATIENT-LVL II: ICD-10-PCS | Mod: PBBFAC,,, | Performed by: STUDENT IN AN ORGANIZED HEALTH CARE EDUCATION/TRAINING PROGRAM

## 2019-08-27 PROCEDURE — 99999 PR PBB SHADOW E&M-EST. PATIENT-LVL II: CPT | Mod: PBBFAC,,, | Performed by: STUDENT IN AN ORGANIZED HEALTH CARE EDUCATION/TRAINING PROGRAM

## 2019-08-27 PROCEDURE — 99213 PR OFFICE/OUTPT VISIT, EST, LEVL III, 20-29 MIN: ICD-10-PCS | Mod: S$GLB,,, | Performed by: STUDENT IN AN ORGANIZED HEALTH CARE EDUCATION/TRAINING PROGRAM

## 2019-08-27 PROCEDURE — 99213 OFFICE O/P EST LOW 20 MIN: CPT | Mod: S$GLB,,, | Performed by: STUDENT IN AN ORGANIZED HEALTH CARE EDUCATION/TRAINING PROGRAM

## 2019-08-27 PROCEDURE — 3074F PR MOST RECENT SYSTOLIC BLOOD PRESSURE < 130 MM HG: ICD-10-PCS | Mod: CPTII,S$GLB,, | Performed by: STUDENT IN AN ORGANIZED HEALTH CARE EDUCATION/TRAINING PROGRAM

## 2019-08-27 NOTE — PROGRESS NOTES
Clinic Note  8/27/2019      Subjective:       Patient ID:  Jose Luis is a 48 y.o. male being seen for an new visit.      Chief Complaint: No chief complaint on file.    Mr. Burr is a 49 y/o CM presenting to the clinic today for new patient care establishment and for a routine Physical. The patient has known medical issues of Testicular Failure, HTN, and inflammatory Bowel Disease (not currently on maintenance treatment). The patient has been seen last by Urology in October of 2018 for follow up on his semen analysis and fertility potential at which point he had routine labs done (CBC, CMP, Lipid Panel). Today the patient is reporting that he has successfully weaned himself off of crateum (chinese opioid) over the last two weeks. He had a couple days stretch of de-tox over the course of last week that has now passed and the patient is feeling better. He  reports that he has not had any active bleeding or flare ups of his IBD since being on the crateum and currently takes CBD oil that he feels helps his situation. In the event that this changes he has agreed to notify me so that an appointment can be set up with Dr. Suárez for further work up and re-initiation of IBD treatment. He is controlling his HTN with diet and exercise and reports that his BP readings at home have been consistently in the high 120s -130s/ 80s. Today in clinic his BP was 128/86. The patient has agreed to keep a log of his BP reading over the course of the next few weeks and follow up with me in one month if he notices consistently high readings to start anti-hypertensive medications.      Family History   Problem Relation Age of Onset    Heart disease Father     Stroke Father     Heart disease Mother     Hypertension Sister     Colon cancer Neg Hx     Esophageal cancer Neg Hx      Social History     Socioeconomic History    Marital status: Single     Spouse name: Not on file    Number of children: Not on file    Years of education: Not  on file    Highest education level: Not on file   Occupational History     Employer: OTHER     Comment:    Social Needs    Financial resource strain: Not on file    Food insecurity:     Worry: Not on file     Inability: Not on file    Transportation needs:     Medical: Not on file     Non-medical: Not on file   Tobacco Use    Smoking status: Former Smoker     Types: Cigarettes    Smokeless tobacco: Never Used   Substance and Sexual Activity    Alcohol use: Yes     Comment: socially    Drug use: Yes     Types: Marijuana    Sexual activity: Yes     Partners: Female   Lifestyle    Physical activity:     Days per week: Not on file     Minutes per session: Not on file    Stress: Not on file   Relationships    Social connections:     Talks on phone: Not on file     Gets together: Not on file     Attends [employer] service: Not on file     Active member of club or organization: Not on file     Attends meetings of clubs or organizations: Not on file     Relationship status: Not on file   Other Topics Concern    Not on file   Social History Narrative    Not on file     Past Surgical History:   Procedure Laterality Date    COLONOSCOPY N/A 4/9/2018    Performed by FRANKY Hdz MD at [facility] END[facility] (4TH FLR)    TONSILLECTOMY      VARICOCELECTOMY Left 6/26/2018    Performed by Diego Arnett MD at [facility] OR 2ND FLR     Medication List with Changes/Refills   Current Medications    LACTOBAC NO.41/BIFIDOBACT NO.7 (PROBIOTIC-10 ORAL)    Take by mouth.    MULTIVITAMIN (ONE DAILY MULTIVITAMIN) PER TABLET    Take 1 tablet by mouth once daily.     Patient Active Problem List   Diagnosis    Testicular failure    Hypertension    Inflammatory bowel disease     Review of Systems   Constitutional: Negative for diaphoresis, fever and weight loss.   HENT: Negative for congestion, sinus pain and sore throat.    Eyes: Positive for blurred vision. Negative for double vision and photophobia.   Respiratory: Negative  "for cough, sputum production and shortness of breath.    Cardiovascular: Negative for chest pain, palpitations and leg swelling.   Gastrointestinal: Negative for abdominal pain, blood in stool, constipation, diarrhea, heartburn, melena and nausea.   Genitourinary: Negative for dysuria, frequency, hematuria and urgency.   Musculoskeletal: Positive for joint pain and myalgias.   Neurological: Positive for headaches. Negative for dizziness, tremors and loss of consciousness.   Psychiatric/Behavioral: Negative for depression and hallucinations. The patient is not nervous/anxious and does not have insomnia.          Objective:      /86   Estimated body mass index is 29.72 kg/m² as calculated from the following:    Height as of 10/29/18: 6' 2" (1.88 m).    Weight as of 10/29/18: 105 kg (231 lb 7.7 oz).  Physical Exam   Constitutional: He is oriented to person, place, and time and well-developed, well-nourished, and in no distress. No distress.   HENT:   Head: Normocephalic and atraumatic.   Eyes: Conjunctivae are normal.   Neck: Normal range of motion. Neck supple. No JVD present.   Cardiovascular: Normal rate, regular rhythm and normal heart sounds.   No murmur heard.  Pulmonary/Chest: Effort normal and breath sounds normal. No respiratory distress. He exhibits no tenderness.   Abdominal: Soft. Bowel sounds are normal. He exhibits no distension. There is no tenderness.   Musculoskeletal: Normal range of motion. He exhibits no edema, tenderness or deformity.   Neurological: He is alert and oriented to person, place, and time.   Skin: Skin is warm and dry. No rash noted. He is not diaphoretic. No erythema. No pallor.   Psychiatric: Mood, memory, affect and judgment normal.         Assessment and Plan:   1. Annual Physical Exam  -- Routine labs CBC, CMP, Lipid Panel    2. Essential Hypertension  The patient has a strong family history of heart disease and Stroke. Not currently on anti-hypertensive medication. " Controlled through diet and excercise     3. Inflammatory Bowel Disease  Diagnosed on 4/2018 by biopsies of rectosigmoid showing chronic inflammation suggestive of Inflammatory bowel Disease.  He completed a one month course of mesalamine with 75% improvement upon diagnosis and then discontinued in favor chinese acupuncture and herbal supplements.  -- Has been seen in the past by Dr. Suárez, GI. Will follow up if symptoms persist or labs consistent with blood loss anemia  -- FITKIT to rule out occult blood loss. FitKit was given to patient on 8/27/2019 1:14 PM     4. History of Testicular Failure  S/P semen analysis x 2 showing oligoteratospermia  S/P L varicocelectomy on 6/23/19   -- Followed by Dr. Arnett, Urology        Problem List Items Addressed This Visit        Cardiac/Vascular    Hypertension    Relevant Orders    CBC auto differential      Other Visit Diagnoses     Encounter for annual physical exam    -  Primary    Relevant Orders    CBC auto differential    Comprehensive metabolic panel    Lipid panel    History of primary testicular failure        Inflammatory bowel diseases (IBD)        Relevant Orders    Comprehensive metabolic panel    Fecal Immunochemical Test (iFOBT)          Follow up:   Follow up in about 1 month (around 9/27/2019).     Other Orders Placed This Visit:  Orders Placed This Encounter   Procedures    CBC auto differential     Standing Status:   Future     Standing Expiration Date:   10/25/2020    Comprehensive metabolic panel     Standing Status:   Future     Standing Expiration Date:   10/25/2020    Lipid panel     Standing Status:   Future     Standing Expiration Date:   10/25/2020    Fecal Immunochemical Test (iFOBT)     Standing Status:   Future     Standing Expiration Date:   10/25/2020           Nandini Dumont

## 2019-08-28 ENCOUNTER — LAB VISIT (OUTPATIENT)
Dept: LAB | Facility: HOSPITAL | Age: 49
End: 2019-08-28
Payer: COMMERCIAL

## 2019-08-28 DIAGNOSIS — Z00.00 ENCOUNTER FOR ANNUAL PHYSICAL EXAM: ICD-10-CM

## 2019-08-28 DIAGNOSIS — I10 ESSENTIAL HYPERTENSION: ICD-10-CM

## 2019-08-28 DIAGNOSIS — K52.9 INFLAMMATORY BOWEL DISEASES (IBD): ICD-10-CM

## 2019-08-28 LAB
ALBUMIN SERPL BCP-MCNC: 4.2 G/DL (ref 3.5–5.2)
ALP SERPL-CCNC: 73 U/L (ref 55–135)
ALT SERPL W/O P-5'-P-CCNC: 16 U/L (ref 10–44)
ANION GAP SERPL CALC-SCNC: 6 MMOL/L (ref 8–16)
AST SERPL-CCNC: 14 U/L (ref 10–40)
BASOPHILS # BLD AUTO: 0.06 K/UL (ref 0–0.2)
BASOPHILS NFR BLD: 0.9 % (ref 0–1.9)
BILIRUB SERPL-MCNC: 0.5 MG/DL (ref 0.1–1)
BUN SERPL-MCNC: 16 MG/DL (ref 6–20)
CALCIUM SERPL-MCNC: 9.1 MG/DL (ref 8.7–10.5)
CHLORIDE SERPL-SCNC: 106 MMOL/L (ref 95–110)
CHOLEST SERPL-MCNC: 154 MG/DL (ref 120–199)
CHOLEST/HDLC SERPL: 4.1 {RATIO} (ref 2–5)
CO2 SERPL-SCNC: 29 MMOL/L (ref 23–29)
CREAT SERPL-MCNC: 1.2 MG/DL (ref 0.5–1.4)
DIFFERENTIAL METHOD: ABNORMAL
EOSINOPHIL # BLD AUTO: 0.2 K/UL (ref 0–0.5)
EOSINOPHIL NFR BLD: 2.5 % (ref 0–8)
ERYTHROCYTE [DISTWIDTH] IN BLOOD BY AUTOMATED COUNT: 12.5 % (ref 11.5–14.5)
EST. GFR  (AFRICAN AMERICAN): >60 ML/MIN/1.73 M^2
EST. GFR  (NON AFRICAN AMERICAN): >60 ML/MIN/1.73 M^2
GLUCOSE SERPL-MCNC: 98 MG/DL (ref 70–110)
HCT VFR BLD AUTO: 43.7 % (ref 40–54)
HDLC SERPL-MCNC: 38 MG/DL (ref 40–75)
HDLC SERPL: 24.7 % (ref 20–50)
HGB BLD-MCNC: 15 G/DL (ref 14–18)
LDLC SERPL CALC-MCNC: 94.2 MG/DL (ref 63–159)
LYMPHOCYTES # BLD AUTO: 2.2 K/UL (ref 1–4.8)
LYMPHOCYTES NFR BLD: 34.6 % (ref 18–48)
MCH RBC QN AUTO: 30.9 PG (ref 27–31)
MCHC RBC AUTO-ENTMCNC: 34.3 G/DL (ref 32–36)
MCV RBC AUTO: 90 FL (ref 82–98)
MONOCYTES # BLD AUTO: 0.7 K/UL (ref 0.3–1)
MONOCYTES NFR BLD: 10.1 % (ref 4–15)
NEUTROPHILS # BLD AUTO: 3.3 K/UL (ref 1.8–7.7)
NEUTROPHILS NFR BLD: 51.9 % (ref 38–73)
NONHDLC SERPL-MCNC: 116 MG/DL
PLATELET # BLD AUTO: 273 K/UL (ref 150–350)
PMV BLD AUTO: 9 FL (ref 9.2–12.9)
POTASSIUM SERPL-SCNC: 4.6 MMOL/L (ref 3.5–5.1)
PROT SERPL-MCNC: 7.1 G/DL (ref 6–8.4)
RBC # BLD AUTO: 4.86 M/UL (ref 4.6–6.2)
SODIUM SERPL-SCNC: 141 MMOL/L (ref 136–145)
TRIGL SERPL-MCNC: 109 MG/DL (ref 30–150)
WBC # BLD AUTO: 6.41 K/UL (ref 3.9–12.7)

## 2019-08-28 PROCEDURE — 36415 COLL VENOUS BLD VENIPUNCTURE: CPT

## 2019-08-28 PROCEDURE — 85025 COMPLETE CBC W/AUTO DIFF WBC: CPT

## 2019-08-28 PROCEDURE — 80061 LIPID PANEL: CPT

## 2019-08-28 PROCEDURE — 80053 COMPREHEN METABOLIC PANEL: CPT

## 2020-07-26 ENCOUNTER — NURSE TRIAGE (OUTPATIENT)
Dept: ADMINISTRATIVE | Facility: CLINIC | Age: 50
End: 2020-07-26

## 2020-07-26 ENCOUNTER — OFFICE VISIT (OUTPATIENT)
Dept: URGENT CARE | Facility: CLINIC | Age: 50
End: 2020-07-26
Payer: COMMERCIAL

## 2020-07-26 VITALS — OXYGEN SATURATION: 98 % | RESPIRATION RATE: 17 BRPM | HEART RATE: 55 BPM | TEMPERATURE: 98 F

## 2020-07-26 DIAGNOSIS — Z03.818 ENCOUNTER FOR OBSERVATION FOR SUSPECTED EXPOSURE TO OTHER BIOLOGICAL AGENTS RULED OUT: ICD-10-CM

## 2020-07-26 DIAGNOSIS — R10.84 GENERALIZED ABDOMINAL PAIN: Primary | ICD-10-CM

## 2020-07-26 LAB
BILIRUB UR QL STRIP: NEGATIVE
GLUCOSE UR QL STRIP: NEGATIVE
KETONES UR QL STRIP: NEGATIVE
LEUKOCYTE ESTERASE UR QL STRIP: NEGATIVE
PH, POC UA: 5 (ref 5–8)
POC BLOOD, URINE: NEGATIVE
POC NITRATES, URINE: NEGATIVE
PROT UR QL STRIP: NEGATIVE
SP GR UR STRIP: 1.02 (ref 1–1.03)
UROBILINOGEN UR STRIP-ACNC: NORMAL (ref 0.3–2.2)

## 2020-07-26 PROCEDURE — 81003 POCT URINALYSIS, DIPSTICK, AUTOMATED, W/O SCOPE: ICD-10-PCS | Mod: QW,S$GLB,, | Performed by: NURSE PRACTITIONER

## 2020-07-26 PROCEDURE — 81003 URINALYSIS AUTO W/O SCOPE: CPT | Mod: QW,S$GLB,, | Performed by: NURSE PRACTITIONER

## 2020-07-26 PROCEDURE — U0003 INFECTIOUS AGENT DETECTION BY NUCLEIC ACID (DNA OR RNA); SEVERE ACUTE RESPIRATORY SYNDROME CORONAVIRUS 2 (SARS-COV-2) (CORONAVIRUS DISEASE [COVID-19]), AMPLIFIED PROBE TECHNIQUE, MAKING USE OF HIGH THROUGHPUT TECHNOLOGIES AS DESCRIBED BY CMS-2020-01-R: HCPCS

## 2020-07-26 PROCEDURE — 99214 PR OFFICE/OUTPT VISIT, EST, LEVL IV, 30-39 MIN: ICD-10-PCS | Mod: 25,S$GLB,, | Performed by: NURSE PRACTITIONER

## 2020-07-26 PROCEDURE — 99214 OFFICE O/P EST MOD 30 MIN: CPT | Mod: 25,S$GLB,, | Performed by: NURSE PRACTITIONER

## 2020-07-26 NOTE — TELEPHONE ENCOUNTER
Patient calling with c/o diarrhea for the past few days, has been going to PT, but does wear a mask, would like to know best testing options, PCP, Urgent Care and Community Testing offered, will go to Urgent Care for assessment today, ED education done, verb understanding,  Reason for Disposition   [1] COVID-19 infection suspected by caller or triager AND [2] mild symptoms (cough, fever, or others) AND [3] no complications or SOB   [1] Symptoms of COVID-19 (e.g., cough, fever, SOB, or others) AND [2] lives in an area with community spread    Additional Information   Negative: SEVERE difficulty breathing (e.g., struggling for each breath, speaks in single words)   Negative: Difficult to awaken or acting confused (e.g., disoriented, slurred speech)   Negative: Bluish (or gray) lips or face now   Negative: Shock suspected (e.g., cold/pale/clammy skin, too weak to stand, low BP, rapid pulse)   Negative: Sounds like a life-threatening emergency to the triager   Negative: [1] COVID-19 exposure AND [2] NO symptoms   Negative: COVID-19 and Breastfeeding, questions about   Negative: [1] Adult with possible COVID-19 symptoms AND [2] triager concerned about severity of symptoms or other causes   Negative: SEVERE or constant chest pain or pressure (Exception: mild central chest pain, present only when coughing)   Negative: MODERATE difficulty breathing (e.g., speaks in phrases, SOB even at rest, pulse 100-120)   Negative: MILD difficulty breathing (e.g., minimal/no SOB at rest, SOB with walking, pulse <100)   Negative: Chest pain   Negative: Patient sounds very sick or weak to the triager   Negative: Fever > 103 F (39.4 C)   Negative: [1] Fever > 101 F (38.3 C) AND [2] age > 60   Negative: [1] Fever > 100.0 F (37.8 C) AND [2] bedridden (e.g., nursing home patient, CVA, chronic illness, recovering from surgery)   Negative: HIGH RISK patient (e.g., age > 64 years, diabetes, heart or lung disease, weak immune  system)    Protocols used: CORONAVIRUS (COVID-19) DIAGNOSED OR [id number], CORONAVIRUS (COVID-19) EXPOSURE-A-OH

## 2020-07-26 NOTE — PROGRESS NOTES
Subjective:       Patient ID: Jose Luis Burr is a 49 y.o. male.    Vitals:  temperature is 97.8 °F (36.6 °C). His pulse is 55 (abnormal). His respiration is 17 and oxygen saturation is 98%.     Chief Complaint: Diarrhea    Patient presents with c.o diarrhea x 2 days. Patient denies cough, sob, fever. Patient reports feeling faitgued. No known exp to covid. No meds taken otc. Patient is concerned for covid and would like a test.     Diarrhea   This is a new problem. Episode onset: 2 days. The stool consistency is described as watery. The patient states that diarrhea does not awaken him from sleep. Associated symptoms include abdominal pain (cramping with diarrhea, generalized.  ). Pertinent negatives include no arthralgias, chills, coughing, fever, headaches, myalgias or vomiting. Nothing aggravates the symptoms.       Constitution: Positive for fatigue. Negative for chills, sweating and fever.   HENT: Negative for ear pain, congestion, postnasal drip, sinus pain, sinus pressure and sore throat.    Neck: Negative for painful lymph nodes.   Cardiovascular: Negative for chest pain and leg swelling.   Eyes: Negative for double vision and blurred vision.   Respiratory: Negative for cough and shortness of breath.    Gastrointestinal: Positive for abdominal pain (cramping with diarrhea, generalized.  ) and diarrhea. Negative for nausea and vomiting.   Genitourinary: Negative for dysuria, frequency and urgency.   Musculoskeletal: Negative for joint pain, joint swelling, muscle cramps and muscle ache.   Skin: Negative for color change, pale and rash.   Allergic/Immunologic: Negative for seasonal allergies.   Neurological: Negative for dizziness, history of vertigo, light-headedness, passing out and headaches.   Hematologic/Lymphatic: Negative for swollen lymph nodes, easy bruising/bleeding and history of blood clots. Does not bruise/bleed easily.   Psychiatric/Behavioral: Negative for nervous/anxious, sleep disturbance and  depression. The patient is not nervous/anxious.        Objective:      Physical Exam   Constitutional: He is oriented to person, place, and time. He appears well-developed.  Non-toxic appearance. He does not appear ill. No distress.   HENT:   Head: Normocephalic and atraumatic.   Ears:   Right Ear: External ear normal.   Left Ear: External ear normal.   Nose: Nose normal.   Mouth/Throat: Mucous membranes are normal.   Eyes: Conjunctivae and lids are normal.   Cardiovascular: Normal rate, regular rhythm, S1 normal, S2 normal and normal heart sounds.   Pulmonary/Chest: Effort normal and breath sounds normal. No respiratory distress.   Abdominal: Soft. Normal appearance and bowel sounds are normal. He exhibits no distension, no abdominal bruit, no pulsatile midline mass and no mass. There is no splenomegaly or hepatomegaly. There is generalized abdominal tenderness. There is no rigidity, no rebound, no guarding, no tenderness at McBurney's point and negative Calderón's sign.   Musculoskeletal: Normal range of motion.   Neurological: He is alert and oriented to person, place, and time. He has normal strength.   Skin: Skin is warm, dry, not diaphoretic and not pale. Psychiatric: His speech is normal and behavior is normal. Judgment and thought content normal.   Nursing note and vitals reviewed.        Assessment:       1. Generalized abdominal pain    2. Encounter for observation for suspected exposure to other biological agents ruled out        Plan:       Results for orders placed or performed in visit on 07/26/20   POCT Urinalysis, Dipstick, Automated, W/O Scope   Result Value Ref Range    POC Blood, Urine Negative Negative    POC Bilirubin, Urine Negative Negative    POC Urobilinogen, Urine Normal 0.3 - 2.2    POC Ketones, Urine Negative Negative    POC Protein, Urine Negative Negative    POC Nitrates, Urine Negative Negative    POC Glucose, Urine Negative Negative    pH, UA 5.0 5 - 8    POC Specific Gravity, Urine  "1.025 1.003 - 1.029    POC Leukocytes, Urine Negative Negative     Generalized abdominal pain  -     POCT Urinalysis, Dipstick, Automated, W/O Scope    Encounter for observation for suspected exposure to other biological agents ruled out  -     COVID-19 Routine Screening      Patient Instructions   Please follow up with your Primary care provider within 2-5 days if your signs and symptoms have not resolved or worsen.     If your condition worsens or fails to improve we recommend that you receive another evaluation at the emergency room immediately or contact your primary medical clinic to discuss your concerns.    You must understand that you have received an Urgent Care treatment only and that you may be released before all of your medical problems are known or treated.   You, the patient, will arrange for follow up care as instructed.     Nausea/Vomiting/Diarrhea    Increase fluids and advance your diet as tolerated.      Start with liquids, soft foods, and progress into regular diet.  Please drink Pedialyte or use Pedialyte ice pops for electrolyte replacement daily as needed for fluid loss from diarrhea or vomiting.      Give probiotics such as Culturelle once daily    Keep hydrated by drinking fluids regularly, can start with ice chips or sips.      Monitor hydration through urine output, urination at least every 6 hours.    Take over the counter Imodium as directed/ as needed for diarrhea.    Take probiotics or yogurt to replace lost gut solomon from GI issues.     If you were given Zofran, please wait 15-30 minutes after taking it to attempt fluid or food consumption.     - Consume more "binding" foods low in fiber such as bananas, rice, white pastas, bread, etc if diarrhea is present.    - Return to school once active vomiting has ceased, diarrhea is manageable, and no fever for 24 hours (without the use of fever reducer).    - Follow up with PCP or ER if no improvement within 3-5 days, if there are concerns " of dehydration, there is blood in the stool, and/or if there is green or bloody vomit.    Watch for any increase pain, fever, localized pain to right lower abdomen or continued vomiting or diarrhea. Follow up in ER for these symptoms.        Instructions for Patients with Confirmed or Suspected COVID-19    If you are awaiting your test result, you will either be called or it will be released to the patient portal.  If you have any questions about your test, please visit [email or web address] or call our COVID-19 information line at [phone].      Instructions for non-hospitalized or discharged patients with confirmed or suspected COVID-19:       Stay home except to get medical care.    Separate yourself from other people and animals in your home.    Call ahead before visiting your doctor.    Wear a face mask.    Cover your coughs and sneezes.    Clean your hands often.    Avoid sharing personal household items.    Clean all high-touch surfaces every day.    Monitor your symptoms. Seek prompt medical attention if your illness is worsening (e.g., difficulty breathing). Before seeking care, call your healthcare provider.    If you have a medical emergency and must call 911, notify the dispatcher that you have or are being evaluated for COVID-19. If possible, put on a face mask before emergency medical services arrive.    Use the following symptom-based strategy to return to normal activity following a suspected or confirmed case of COVID-19. Continue isolation until:   o At least 3 days (72 hours) have passed since recovery defined as resolution of fever without the use of fever-reducing medications and improvement in respiratory symptoms (e.g. cough, shortness of breath), and   o At least 10 days have passed since the first positive test.       As one of the next steps, you will receive a call or text from the [place] Department of Health ([facility]) COVID-19 Tracing Team. See the contact  information below so you know not to ignore the health departments call. It is important that you contact them back immediately so they can help.     Contact Tracer Number:  [phone]  Caller ID for most carriers: [facility]    What is contact tracing?   Contact tracing is a process that helps identify everyone who has been in close contact with an infected person. Contact tracers let those people know they may have been exposed and guide them on next steps. Confidentiality is important for everyone; no one will be told who may have exposed them to the virus.   Your involvement is important. The more we know about where and how this virus is spreading, the better chance we have at stopping it from spreading further.  What does exposure mean?   Exposure means you have been within 6 feet for more than 15 minutes with a person who has or had COVID-19.  What kind of questions do the contact tracers ask?   A contact tracer will confirm your basic contact information including name, address, phone number, and next of kin, as well as asking about any symptoms you may have had. Theyll also ask you how you think you may have gotten sick, such as places where you may have been exposed to the virus, and people you were with. Those names will never be shared with anyone outside of that call, and will only be used to help trace and stop the spread of the virus.   I have privacy concerns. How will the state use my information?   Your privacy about your health is important. All calls are completed using call centers that use the appropriate health privacy protection measures (HIPAA compliance), meaning that your patient information is safe. No one will ever ask you any questions related to immigration status. Your health comes first.   Do I have to participate?   You do not have to participate, but we strongly encourage you to. Contact tracing can help us catch and control new outbreaks as theyre developing to  keep your friends and family safe.   What if I dont hear from anyone?   If you dont receive a call within 24 hours, you can call the number above right away to inquire about your status. That line is open from 8:00 am - 8:00 p.m., 7 days a week.  Contact tracing saves lives! Together, we have the power to beat this virus and keep our loved ones and neighbors safe.       Instructions for household members, intimate partners and caregivers in a non-healthcare setting of a patient with confirmed or suspected COVID-19:         Close contacts should monitor their health and call their healthcare provider right away if they develop symptoms suggestive of COVID-19 (e.g., fever, cough, shortness of breath).    Stay home except to get medical care. Separate yourself from other people and animals in the home.   Monitor the patients symptoms. If the patient is getting sicker, call his or her healthcare provider. If the patient has a medical emergency and you need to call 911, notify the dispatch personnel that the patient has or is being evaluated for COVID-19.    Wear a facemask when around other people such as sharing a room or vehicle and before entering a healthcare provider's office.   Cover coughs and sneezes with a tissue. Throw used tissues in a lined trash can immediately and wash hands.   Clean hands often with soap and water for at least 20 seconds or with an alcohol-based hand , rubbing hands together until they feel dry. Avoid touching your eyes, nose, and mouth with unwashed hands.   Clean all high-touch; surfaces every day, including counters, tabletops, doorknobs, bathroom fixtures, toilets, phones, keyboards, tablets, bedside tables, etc. Use a household cleaning spray or wipe according to label instructions.   Avoid sharing personal household items such as dishes, drinking glasses, cups, towels, bedding, etc. After these items are used, they should be washed thoroughly with soap and  water.   Continue isolation until:   At least 3 days (72 hours) have passed since recovery defined as resolution of fever without the use of fever-reducing medications and improvement in respiratory symptoms (e.g. cough, shortness of breath), and    At least 10 days have passed since the patients first positive test.    https://www.cdc.gov/coronavirus/2019-ncov/your-health/index.htm

## 2020-07-26 NOTE — PATIENT INSTRUCTIONS
"Please follow up with your Primary care provider within 2-5 days if your signs and symptoms have not resolved or worsen.     If your condition worsens or fails to improve we recommend that you receive another evaluation at the emergency room immediately or contact your primary medical clinic to discuss your concerns.    You must understand that you have received an Urgent Care treatment only and that you may be released before all of your medical problems are known or treated.   You, the patient, will arrange for follow up care as instructed.     Nausea/Vomiting/Diarrhea    Increase fluids and advance your diet as tolerated.      Start with liquids, soft foods, and progress into regular diet.  Please drink Pedialyte or use Pedialyte ice pops for electrolyte replacement daily as needed for fluid loss from diarrhea or vomiting.      Give probiotics such as Culturelle once daily    Keep hydrated by drinking fluids regularly, can start with ice chips or sips.      Monitor hydration through urine output, urination at least every 6 hours.    Take over the counter Imodium as directed/ as needed for diarrhea.    Take probiotics or yogurt to replace lost gut solomon from GI issues.     If you were given Zofran, please wait 15-30 minutes after taking it to attempt fluid or food consumption.     - Consume more "binding" foods low in fiber such as bananas, rice, white pastas, bread, etc if diarrhea is present.    - Return to school once active vomiting has ceased, diarrhea is manageable, and no fever for 24 hours (without the use of fever reducer).    - Follow up with PCP or ER if no improvement within 3-5 days, if there are concerns of dehydration, there is blood in the stool, and/or if there is green or bloody vomit.    Watch for any increase pain, fever, localized pain to right lower abdomen or continued vomiting or diarrhea. Follow up in ER for these symptoms.        Instructions for Patients with Confirmed or Suspected " COVID-19    If you are awaiting your test result, you will either be called or it will be released to the patient portal.  If you have any questions about your test, please visit [email or web address] or call our COVID-19 information line at [phone].      Instructions for non-hospitalized or discharged patients with confirmed or suspected COVID-19:       Stay home except to get medical care.    Separate yourself from other people and animals in your home.    Call ahead before visiting your doctor.    Wear a face mask.    Cover your coughs and sneezes.    Clean your hands often.    Avoid sharing personal household items.    Clean all high-touch surfaces every day.    Monitor your symptoms. Seek prompt medical attention if your illness is worsening (e.g., difficulty breathing). Before seeking care, call your healthcare provider.    If you have a medical emergency and must call 911, notify the dispatcher that you have or are being evaluated for COVID-19. If possible, put on a face mask before emergency medical services arrive.    Use the following symptom-based strategy to return to normal activity following a suspected or confirmed case of COVID-19. Continue isolation until:   o At least 3 days (72 hours) have passed since recovery defined as resolution of fever without the use of fever-reducing medications and improvement in respiratory symptoms (e.g. cough, shortness of breath), and   o At least 10 days have passed since the first positive test.       As one of the next steps, you will receive a call or text from the [place] Department of [facility] ([facility]) COVID-19 Tracing Team. See the contact information below so you know not to ignore the health departments call. It is important that you contact them back immediately so they can help.     Contact Tracer Number:  [phone]  Caller ID for most carriers: [place] Dept [facility]    What is contact tracing?   Contact tracing is a process that  helps identify everyone who has been in close contact with an infected person. Contact tracers let those people know they may have been exposed and guide them on next steps. Confidentiality is important for everyone; no one will be told who may have exposed them to the virus.   Your involvement is important. The more we know about where and how this virus is spreading, the better chance we have at stopping it from spreading further.  What does exposure mean?   Exposure means you have been within 6 feet for more than 15 minutes with a person who has or had COVID-19.  What kind of questions do the contact tracers ask?   A contact tracer will confirm your basic contact information including name, address, phone number, and next of kin, as well as asking about any symptoms you may have had. Theyll also ask you how you think you may have gotten sick, such as places where you may have been exposed to the virus, and people you were with. Those names will never be shared with anyone outside of that call, and will only be used to help trace and stop the spread of the virus.   I have privacy concerns. How will the state use my information?   Your privacy about your health is important. All calls are completed using call centers that use the appropriate health privacy protection measures (HIPAA compliance), meaning that your patient information is safe. No one will ever ask you any questions related to immigration status. Your health comes first.   Do I have to participate?   You do not have to participate, but we strongly encourage you to. Contact tracing can help us catch and control new outbreaks as theyre developing to keep your friends and family safe.   What if I dont hear from anyone?   If you dont receive a call within 24 hours, you can call the number above right away to inquire about your status. That line is open from 8:00 am - 8:00 p.m., 7 days a week.  Contact tracing saves lives! Together, we have the  power to beat this virus and keep our loved ones and neighbors safe.       Instructions for household members, intimate partners and caregivers in a non-healthcare setting of a patient with confirmed or suspected COVID-19:         Close contacts should monitor their health and call their healthcare provider right away if they develop symptoms suggestive of COVID-19 (e.g., fever, cough, shortness of breath).    Stay home except to get medical care. Separate yourself from other people and animals in the home.   Monitor the patients symptoms. If the patient is getting sicker, call his or her healthcare provider. If the patient has a medical emergency and you need to call 911, notify the dispatch personnel that the patient has or is being evaluated for COVID-19.    Wear a facemask when around other people such as sharing a room or vehicle and before entering a healthcare provider's office.   Cover coughs and sneezes with a tissue. Throw used tissues in a lined trash can immediately and wash hands.   Clean hands often with soap and water for at least 20 seconds or with an alcohol-based hand , rubbing hands together until they feel dry. Avoid touching your eyes, nose, and mouth with unwashed hands.   Clean all high-touch; surfaces every day, including counters, tabletops, doorknobs, bathroom fixtures, toilets, phones, keyboards, tablets, bedside tables, etc. Use a household cleaning spray or wipe according to label instructions.   Avoid sharing personal household items such as dishes, drinking glasses, cups, towels, bedding, etc. After these items are used, they should be washed thoroughly with soap and water.   Continue isolation until:   At least 3 days (72 hours) have passed since recovery defined as resolution of fever without the use of fever-reducing medications and improvement in respiratory symptoms (e.g. cough, shortness of breath), and    At least 10 days have passed since the patients  first positive test.    https://www.cdc.gov/coronavirus/2019-ncov/your-health/index.htm

## 2020-07-27 LAB — SARS-COV-2 RNA RESP QL NAA+PROBE: NOT DETECTED

## 2020-07-28 NOTE — TELEPHONE ENCOUNTER
Patient was seen at urgent care and is COVID negative. He says that his symptoms have abated and he is doing better. I told him to reach out to us if he needs anything else.

## 2020-11-10 ENCOUNTER — OFFICE VISIT (OUTPATIENT)
Dept: URGENT CARE | Facility: CLINIC | Age: 50
End: 2020-11-10
Payer: COMMERCIAL

## 2020-11-10 VITALS
OXYGEN SATURATION: 95 % | HEIGHT: 74 IN | BODY MASS INDEX: 31.44 KG/M2 | DIASTOLIC BLOOD PRESSURE: 94 MMHG | TEMPERATURE: 98 F | HEART RATE: 70 BPM | SYSTOLIC BLOOD PRESSURE: 149 MMHG | WEIGHT: 245 LBS | RESPIRATION RATE: 16 BRPM

## 2020-11-10 DIAGNOSIS — R19.7 DIARRHEA, UNSPECIFIED TYPE: ICD-10-CM

## 2020-11-10 DIAGNOSIS — I10 ESSENTIAL HYPERTENSION: ICD-10-CM

## 2020-11-10 DIAGNOSIS — R03.0 ELEVATED BLOOD PRESSURE READING: ICD-10-CM

## 2020-11-10 DIAGNOSIS — Z20.822 EXPOSURE TO COVID-19 VIRUS: Primary | ICD-10-CM

## 2020-11-10 DIAGNOSIS — K52.9 INFLAMMATORY BOWEL DISEASE: ICD-10-CM

## 2020-11-10 LAB
CTP QC/QA: YES
SARS-COV-2 RDRP RESP QL NAA+PROBE: NEGATIVE

## 2020-11-10 PROCEDURE — 99214 OFFICE O/P EST MOD 30 MIN: CPT | Mod: S$GLB,,, | Performed by: FAMILY MEDICINE

## 2020-11-10 PROCEDURE — U0002: ICD-10-PCS | Mod: QW,S$GLB,, | Performed by: FAMILY MEDICINE

## 2020-11-10 PROCEDURE — 99214 PR OFFICE/OUTPT VISIT, EST, LEVL IV, 30-39 MIN: ICD-10-PCS | Mod: S$GLB,,, | Performed by: FAMILY MEDICINE

## 2020-11-10 PROCEDURE — U0002 COVID-19 LAB TEST NON-CDC: HCPCS | Mod: QW,S$GLB,, | Performed by: FAMILY MEDICINE

## 2020-11-10 NOTE — PATIENT INSTRUCTIONS
Instructions for Patients with Confirmed or Suspected COVID-19    If you are awaiting your test result, you will either be called or it will be released to the patient portal.  If you have any questions about your test, please visit [email or web address] or call our COVID-19 information line at [phone].      Stay home and stay away from family members and friends. You may leave home and/or return to work when the following conditions are met:   24 hours fever free without fever-reducing medications AND   Improved symptoms     Separate yourself from other people and animals in your home.   Call ahead before visiting your doctor.   Wear a facemask.   Cover your coughs and sneezes.   Wash your hands often with soap and water; hand  can be used, too.   Avoid sharing personal household items.   Wipe down surfaces used daily.   Monitor your symptoms. Seek prompt medical attention if your illness is worsening (e.g., difficulty breathing).    Before seeking care, call your healthcare provider.   If you have a medical emergency and need to call 911, notify the dispatch personnel that you have, or are being evaluated for COVID-19. If possible, put on a facemask before emergency medical services arrive.        Recommended precautions for household members, intimate partners, and caregivers in a home setting of a patient with symptomatic laboratory-confirmed COVID-19 or a patient under investigation.  Household members, intimate partners, and caregivers in the home setting awaiting tests results have close contact with a person with symptomatic, laboratory-confirmed COVID-19 or a person under investigation. Close contacts should monitor their health; they should call their provider right away if they develop symptoms suggestive of COVID-19 (e.g., fever, cough, shortness of breath).    Close contacts should also follow these recommendations:   Make sure that you understand and can help the  patient follow their provider's instructions for medication(s) and care. You should help the patient with basic needs in the home and provide support for getting groceries, prescriptions, and other personal needs.   Monitor the patient's symptoms. If the patient is getting sicker, call his or her healthcare provider and tell them that the patient has laboratory-confirmed COVID-19. If the patient has a medical emergency and you need to call 911, notify the dispatch personnel that the patient has, or is being evaluated for COVID-19.   Household members should stay in another room or be  from the patient. Household members should use a separate bedroom and bathroom, if available.   Prohibit visitors.   Household members should care for any pets in the home.   Make sure that shared spaces in the home have good air flow, such as by an air conditioner or an opened window, weather permitting.   Perform hand hygiene frequently. Wash your hands often with soap and water for at least 20 seconds or use an alcohol-based hand  (that contains > 60% alcohol) covering all surfaces of your hands and rubbing them together until they feel dry. Soap and water should be used preferentially.   Avoid touching your eyes, nose, and mouth.   The patient should wear a facemask. If the patient is not able to wear a facemask (for example, because it causes trouble breathing), caregivers should wear a mask when they are in the same room as the patient.   Wear a disposable facemask and gloves when you touch or have contact with the patient's blood, stool, or body fluids, such as saliva, sputum, nasal mucus, vomit, urine.  o Throw out disposable facemasks and gloves after using them. Do not reuse.  o When removing personal protective equipment, first remove and dispose of gloves. Then, immediately clean your hands with soap and water or alcohol-based hand . Next, remove and dispose of facemask, and immediately  clean your hands again with soap and water or alcohol-based hand .   You should not share dishes, drinking glasses, cups, eating utensils, towels, bedding, or other items with the patient. After the patient uses these items, you should wash them thoroughly (see below Wash laundry thoroughly).   Clean all high-touch surfaces, such as counters, tabletops, doorknobs, bathroom fixtures, toilets, phones, keyboards, tablets, and bedside tables, every day. Also, clean any surfaces that may have blood, stool, or body fluids on them.   Use a household cleaning spray or wipe, according to the label instructions. Labels contain instructions for safe and effective use of the cleaning product including precautions you should take when applying the product, such as wearing gloves and making sure you have good ventilation during use of the product.   Wash laundry thoroughly.  o Immediately remove and wash clothes or bedding that have blood, stool, or body fluids on them.  o Wear disposable gloves while handling soiled items and keep soiled items away from your body. Clean your hands (with soap and water or an alcohol-based hand ) immediately after removing your gloves.  o Read and follow directions on labels of laundry or clothing items and detergent. In general, using a normal laundry detergent according to washing machine instructions and dry thoroughly using the warmest temperatures recommended on the clothing label.   Place all used disposable gloves, facemasks, and other contaminated items in a lined container before disposing of them with other household waste. Clean your hands (with soap and water or an alcohol-based hand ) immediately after handling these items. Soap and water should be used preferentially if hands are visibly dirty.   Discuss any additional questions with your state or local health department or healthcare provider. Check available hours when contacting your local  health department.    For more information see CDC link below.      https://www.cdc.gov/coronavirus/2019-ncov/hcp/guidance-prevent-spread.html#precautions        Sources:  [facility], [place] [facility] and [facility]      Your test was NEGATIVE for COVID-19 (coronavirus).        If your symptoms worsen or if you have any other concerns, please contact [facility] at [phone].     Sincerely,    Stephany Weathers, DO  Uncertain Causes of Diarrhea (Adult)    Diarrhea is when stools are loose and watery. This can be caused by:  · Viral infections  · Bacterial infections  · Food poisoning  · Parasites  · Irritable bowel syndrome (IBS)  · Inflammatory bowel diseases such as ulcerative colitis, Crohn's disease, and celiac disease  · Food intolerance, such as to lactose, the sugar found in milk and milk products  · Reaction to medicines like antibiotics, laxatives, cancer drugs, and antacids  Along with diarrhea, you may also have:  · Abdominal pain and cramping  · Nausea and vomiting  · Loss of bowel control  · Fever and chills  · Bloody stools  In some cases, antibiotics may help to treat diarrhea. You may have a stool sample test. This is done to see what is causing your diarrhea, and if antibiotics will help treat it. The results of a stool sample test may take up to 2 days. The healthcare provider may not give you antibiotics until he or she has the stool test results.  Diarrhea can cause dehydration. This is the loss of too much water and other fluids from the body. When this occurs, body fluid must be replaced. This can be done with oral rehydration solutions. Oral rehydration solutions are available at drugstores and grocery stores without a prescription.  Home care  Follow all instructions given by your healthcare provider. Rest at home for the next 24 hours, or until you feel better. Avoid caffeine, tobacco, and alcohol. These can make diarrhea, cramping, and pain worse.  If taking medicines:  · Dont take  over-the-counter diarrhea or nausea medicines unless your healthcare provider tells you to.  · You may use acetaminophen or NSAID medicines like ibuprofen or naproxen to reduce pain and fever. Dont use these if you have chronic liver or kidney disease, or ever had a stomach ulcer or gastrointestinal bleeding. Don't use NSAID medicines if you are already taking one for another condition (like arthritis) or are on daily aspirin therapy (such as for heart disease or after a stroke). Talk with your healthcare provider first.  · If antibiotics were prescribed, be sure you take them until they are finished. Dont stop taking them even when you feel better. Antibiotics must be taken as a full course.  To prevent the spread of illness:  · Remember that washing with soap and water and using alcohol-based  is the best way to prevent the spread of infection.  · Clean the toilet after each use.  · Wash your hands before eating.  · Wash your hands before and after preparing food. Keep in mind that people with diarrhea or vomiting should not prepare food for others.  · Wash your hands after using cutting boards, countertops, and knives that have been in contact with raw foods.  · Wash and then peel fruits and vegetables.  · Keep uncooked meats away from cooked and ready-to-eat foods.  · Use a food thermometer when cooking. Cook poultry to at least 165°F (74°C). Cook ground meat (beef, veal, pork, lamb) to at least 160°F (71°C). Cook fresh beef, veal, lamb, and pork to at least 145°F (63°C).  · Dont eat raw or undercooked eggs (poached or paula side up), poultry, meat, or unpasteurized milk and juices.  Food and drinks  The main goal while treating vomiting or diarrhea is to prevent dehydration. This is done by taking small amounts of liquids often.  · Keep in mind that liquids are more important than food right now.  · Drink only small amounts of liquids at a time.  · Dont force yourself to eat, especially if you  are having cramping, vomiting, or diarrhea. Dont eat large amounts at a time, even if you are hungry.  · If you eat, avoid fatty, greasy, spicy, or fried foods.  · Dont eat dairy foods or drink milk if you have diarrhea. These can make diarrhea worse.  During the first 24 hours you can try:  · Oral rehydration solutions. Do not use sports drinks. They have too much sugar and not enough electrolytes.  · Soft drinks without caffeine  · Ginger ale  · Water (plain or flavored)  · Decaf tea or coffee  · Clear broth, consommé, or bouillon  · Gelatin, popsicles, or frozen fruit juice bars  The second 24 hours, if you are feeling better, you can add:  · Hot cereal, plain toast, bread, rolls, or crackers  · Plain noodles, rice, mashed potatoes, chicken noodle soup, or rice soup  · Unsweetened canned fruit (no pineapple)  · Bananas  As you recover:  · Limit fat intake to less than 15 grams per day. Dont eat margarine, butter, oils, mayonnaise, sauces, gravies, fried foods, peanut butter, meat, poultry, or fish.  · Limit fiber. Dont eat raw or cooked vegetables, fresh fruits except bananas, or bran cereals.  · Limit caffeine and chocolate.  · Limit dairy.  · Dont use spices or seasonings except salt.  · Go back to your normal diet over time, as you feel better and your symptoms improve.  · If the symptoms come back, go back to a simple diet or clear liquids.  Follow-up care  Follow up with your healthcare provider, or as advised. If a stool sample was taken or cultures were done, call the healthcare provider for the results as instructed.  Call 911  Call 911 if you have any of these symptoms:  · Trouble breathing  · Confusion  · Extreme drowsiness or trouble walking  · Loss of consciousness  · Rapid heart rate  · Chest pain  · Stiff neck  · Seizure  When to seek medical advice  Call your healthcare provider right away if any of these occur:  · Abdominal pain that gets worse  · Constant lower right abdominal  pain  · Continued vomiting and inability to keep liquids down  · Diarrhea more than 5 times a day  · Blood in vomit or stool  · Dark urine or no urine for 8 hours, dry mouth and tongue, tiredness, weakness, or dizziness  · Drowsiness  · New rash  · You dont get better in 2 to 3 days  · Fever of 100.4°F (38°C) or higher that doesnt get lower with medicine  Date Last Reviewed: 1/3/2016  © 1530-8115 [employer]. [address] [place] [address]. All rights reserved. This information is not intended as a substitute for professional medical care. Always follow your healthcare professional's instructions.        Managing Ulcerative Colitis: Medicines  Your healthcare provider may prescribe medicine to help control your ulcerative colitis. Medicine can help lessen symptoms. It wont cure ulcerative colitis, but it can help improve your quality of life. Work closely with your healthcare provider. You may have certain side effects or your symptoms may change. In this case, your medicine or dosage may need to be changed.  Types of medicines  You may be prescribed any of these types of medicines:  · Anti-inflammatories  · Corticosteroids  · Immunomodulators  · Biologic agents  · Antibiotics  · Probiotics  You can learn more about each kind below.  Anti-inflammatories  These medicines can reduce inflammation and pain in the intestinal lining. The most common anti-inflammatories for ulcerative colitis are 5-ASA compounds and mesalamine. These help control symptoms over long periods of time. They may be taken as pills, but they also can be taken as an enema or suppository inserted directly into the rectum.  Corticosteroids  Your healthcare provider may advise you to take corticosteroids. These help to calm inflammation in your body. This can make your symptoms better quickly. You may take corticosteroids as a pill or liquid by mouth. In some cases, they may be given through an IV. Or they may be given  rectally as either a suppository or an enema. You take them for a short time, usually not longer than 8 to 12 weeks. You do not take them when you are in remission. Remission is a long period with no symptoms.  If used for a long time, side effects may include:  · Mood changes  · Trouble sleeping  · Changes in body shape  · Puffy face or acne  · Weight gain  · Stretch marks  · Eye problems  · Bone loss or breaks  · Facial hair in women  · High blood pressure  · Risk of diabetes  Immunomodulators  These medicines cause your body's immune system to be less active. This can help reduce inflammation and calm your symptoms. They are taken as a pill by mouth. You may not feel their effects until you have taken them for a few months. But you can take them for a long time. You will need to have blood tests every few months to check your liver and blood cell counts.   Side effects may include:  · Nausea  · Body aches  · Inflammation of the pancreas  · Low white blood cell count  · Liver problems  · Low folic acid levels  · Infection  · Lymphoma  · Non-melanoma skin cancer  Biologic agents  These kinds of medicines help stop body chemicals that cause inflammation. One medicine blocks a chemical called tumor necrosis factor (TNF). The medicine is also known as anti-TNF monoclonal antibodies. Another type of medicine blocks white blood cells from getting into the intestinal tissue and causing inflammation. New biologics are also being created that target different ways the intestine gets inflamed in ulcerative colitis. These medicines may be given different ways. They may be given by vein (IV) every 2 to 8 weeks. They may be given with a shot (injection) once a week or once a month. These medicines can put you at risk for infections. Tell your healthcare provider if you have a chronic infection. You will need to be tested for tuberculosis and hepatitis B infection before taking the medicine.  Side effects may  include:  · Flushing, check pain, shortness of breath, hives, or a drop in blood pressure during IV treatment  · Joint and muscle aches  · Rash  · Fever  · Infection  · Lymphoma  · Skin cancers  · Liver toxicity  · TNF-induced psoriasis  Antibiotics  These may be used if you also have an infection, such as an abscess. Antibiotics may be given as a pill taken by mouth. You should stay out of the sun while taking them. You should also not drink alcohol while taking them. Antibiotics may cause severe reactions. These can include nausea, vomiting, and breathing problems. Tell your healthcare provider right away if you have numbness or tingling in your hands. Also tell your healthcare provider if your bowel symptoms become worse.  Side effects may include:  · Nausea  · Diarrhea  · Headaches  · Dizziness  · Dark urine  · Loss of appetite  · Metallic taste in the mouth  · Sensitivity to the sun  Probiotics  Probiotics are living organisms beneficial for health that you can take in a supplement. They can help some cases of ulcerative colitis. Talk with your healthcare provider to learn more.  Managing side effects  Your healthcare provider will explain the side effects of any new medicines. In most cases, side effects are easy to manage. But sometimes they can be so severe that you need to change medicine. Call your healthcare provider if you have any of the below:  · Side effects that are hard to manage  · Severe side effects  · Unexpected side effects   Date Last Reviewed: 11/1/2016  © 3711-0539 [employer]. [address] [place] [address], [place] [address]. All rights reserved. This information is not intended as a substitute for professional medical care. Always follow your healthcare professional's instructions.

## 2020-11-10 NOTE — PROGRESS NOTES
"Subjective:       Patient ID: Jose Luis Burr is a 50 y.o. male.    Vitals:  height is 6' 2" (1.88 m) and weight is 111.1 kg (245 lb). His temperature is 97.6 °F (36.4 °C). His blood pressure is 149/94 (abnormal) and his pulse is 70. His respiration is 16 and oxygen saturation is 95%.     Chief Complaint: Diarrhea    Pt presetns with c o diarrhea x 3 days. Pt states fever 2 days ago  Diarrhea   This is a new problem. Episode onset: 3 days. The stool consistency is described as blood tinged. The patient states that diarrhea awakens him from sleep. Associated symptoms include a fever and myalgias. Pertinent negatives include no arthralgias, chills, coughing, headaches or vomiting. Treatments tried: pepto bismol. The treatment provided moderate relief. ulcerative colitis       Constitution: Positive for fever. Negative for chills and fatigue.   HENT: Negative for congestion and sore throat.    Neck: Positive for painful lymph nodes.   Cardiovascular: Negative for chest pain and leg swelling.   Eyes: Negative for double vision and blurred vision.   Respiratory: Negative for cough and shortness of breath.    Gastrointestinal: Positive for diarrhea. Negative for nausea and vomiting.   Genitourinary: Negative for dysuria, frequency and urgency.   Musculoskeletal: Positive for muscle ache. Negative for joint pain, joint swelling and muscle cramps.   Skin: Negative for color change, pale and rash.   Allergic/Immunologic: Negative for seasonal allergies.   Neurological: Negative for dizziness, history of vertigo, light-headedness, passing out and headaches.   Hematologic/Lymphatic: Positive for swollen lymph nodes. Negative for easy bruising/bleeding and history of blood clots. Does not bruise/bleed easily.   Psychiatric/Behavioral: Negative for nervous/anxious, sleep disturbance and depression. The patient is not nervous/anxious.        Objective:      Physical Exam   Constitutional: He is oriented to person, place, and " time. He appears well-developed. He is cooperative.  Non-toxic appearance. He does not appear ill. No distress.   HENT:   Head: Normocephalic and atraumatic.   Ears:   Right Ear: Hearing and external ear normal.   Left Ear: Hearing and external ear normal.   Nose: Nose normal. No mucosal edema, rhinorrhea or nasal deformity. No epistaxis. Right sinus exhibits no maxillary sinus tenderness and no frontal sinus tenderness. Left sinus exhibits no maxillary sinus tenderness and no frontal sinus tenderness.   Mouth/Throat: Uvula is midline, oropharynx is clear and moist and mucous membranes are normal. No trismus in the jaw. Normal dentition. No uvula swelling. No oropharyngeal exudate, posterior oropharyngeal edema or posterior oropharyngeal erythema.   Eyes: Conjunctivae and lids are normal. No scleral icterus.   Neck: Trachea normal, full passive range of motion without pain and phonation normal. Neck supple. No neck rigidity. No edema and no erythema present.   Cardiovascular: Normal rate, regular rhythm, normal heart sounds and normal pulses.   Pulmonary/Chest: Effort normal and breath sounds normal. No respiratory distress. He has no decreased breath sounds. He has no rhonchi.   Abdominal: Normal appearance.   Musculoskeletal: Normal range of motion.         General: No deformity.   Neurological: He is alert and oriented to person, place, and time. He exhibits normal muscle tone. Coordination normal.   Skin: Skin is warm, dry, intact, not diaphoretic and not pale. Psychiatric: His speech is normal and behavior is normal. Judgment and thought content normal.   Nursing note and vitals reviewed.        Assessment:       1. Exposure to COVID-19 virus    2. Diarrhea, unspecified type    3. Inflammatory bowel disease    4. Essential hypertension    5. Elevated blood pressure reading        Plan:         Exposure to COVID-19 virus  -     POCT COVID-19 Rapid Screening    Diarrhea, unspecified type    Inflammatory bowel  disease    Essential hypertension    Elevated blood pressure reading      Patient Instructions   Instructions for Patients with Confirmed or Suspected COVID-19    If you are awaiting your test result, you will either be called or it will be released to the patient portal.  If you have any questions about your test, please visit [email or web address] or call our COVID-19 information line at [phone].      Stay home and stay away from family members and friends. You may leave home and/or return to work when the following conditions are met:   24 hours fever free without fever-reducing medications AND   Improved symptoms     Separate yourself from other people and animals in your home.   Call ahead before visiting your doctor.   Wear a facemask.   Cover your coughs and sneezes.   Wash your hands often with soap and water; hand  can be used, too.   Avoid sharing personal household items.   Wipe down surfaces used daily.   Monitor your symptoms. Seek prompt medical attention if your illness is worsening (e.g., difficulty breathing).    Before seeking care, call your healthcare provider.   If you have a medical emergency and need to call 911, notify the dispatch personnel that you have, or are being evaluated for COVID-19. If possible, put on a facemask before emergency medical services arrive.        Recommended precautions for household members, intimate partners, and caregivers in a home setting of a patient with symptomatic laboratory-confirmed COVID-19 or a patient under investigation.  Household members, intimate partners, and caregivers in the home setting awaiting tests results have close contact with a person with symptomatic, laboratory-confirmed COVID-19 or a person under investigation. Close contacts should monitor their health; they should call their provider right away if they develop symptoms suggestive of COVID-19 (e.g., fever, cough, shortness of breath).    Close contacts  should also follow these recommendations:   Make sure that you understand and can help the patient follow their provider's instructions for medication(s) and care. You should help the patient with basic needs in the home and provide support for getting groceries, prescriptions, and other personal needs.   Monitor the patient's symptoms. If the patient is getting sicker, call his or her healthcare provider and tell them that the patient has laboratory-confirmed COVID-19. If the patient has a medical emergency and you need to call 911, notify the dispatch personnel that the patient has, or is being evaluated for COVID-19.   Household members should stay in another room or be  from the patient. Household members should use a separate bedroom and bathroom, if available.   Prohibit visitors.   Household members should care for any pets in the home.   Make sure that shared spaces in the home have good air flow, such as by an air conditioner or an opened window, weather permitting.   Perform hand hygiene frequently. Wash your hands often with soap and water for at least 20 seconds or use an alcohol-based hand  (that contains > 60% alcohol) covering all surfaces of your hands and rubbing them together until they feel dry. Soap and water should be used preferentially.   Avoid touching your eyes, nose, and mouth.   The patient should wear a facemask. If the patient is not able to wear a facemask (for example, because it causes trouble breathing), caregivers should wear a mask when they are in the same room as the patient.   Wear a disposable facemask and gloves when you touch or have contact with the patient's blood, stool, or body fluids, such as saliva, sputum, nasal mucus, vomit, urine.  o Throw out disposable facemasks and gloves after using them. Do not reuse.  o When removing personal protective equipment, first remove and dispose of gloves. Then, immediately clean your hands with soap and  water or alcohol-based hand . Next, remove and dispose of facemask, and immediately clean your hands again with soap and water or alcohol-based hand .   You should not share dishes, drinking glasses, cups, eating utensils, towels, bedding, or other items with the patient. After the patient uses these items, you should wash them thoroughly (see below Wash laundry thoroughly).   Clean all high-touch surfaces, such as counters, tabletops, doorknobs, bathroom fixtures, toilets, phones, keyboards, tablets, and bedside tables, every day. Also, clean any surfaces that may have blood, stool, or body fluids on them.   Use a household cleaning spray or wipe, according to the label instructions. Labels contain instructions for safe and effective use of the cleaning product including precautions you should take when applying the product, such as wearing gloves and making sure you have good ventilation during use of the product.   Wash laundry thoroughly.  o Immediately remove and wash clothes or bedding that have blood, stool, or body fluids on them.  o Wear disposable gloves while handling soiled items and keep soiled items away from your body. Clean your hands (with soap and water or an alcohol-based hand ) immediately after removing your gloves.  o Read and follow directions on labels of laundry or clothing items and detergent. In general, using a normal laundry detergent according to washing machine instructions and dry thoroughly using the warmest temperatures recommended on the clothing label.   Place all used disposable gloves, facemasks, and other contaminated items in a lined container before disposing of them with other household waste. Clean your hands (with soap and water or an alcohol-based hand ) immediately after handling these items. Soap and water should be used preferentially if hands are visibly dirty.   Discuss any additional questions with your state or local  health department or healthcare provider. Check available hours when contacting your local health department.    For more information see CDC link below.      https://www.cdc.gov/coronavirus/2019-ncov/hcp/guidance-prevent-spread.html#precautions        Sources:  [facility], [place] [facility]      Your test was NEGATIVE for COVID-19 (coronavirus).        If your symptoms worsen or if you have any other concerns, please contact [facility] at [phone].     Sincerely,    Stephany Weathers, DO  Uncertain Causes of Diarrhea (Adult)    Diarrhea is when stools are loose and watery. This can be caused by:  · Viral infections  · Bacterial infections  · Food poisoning  · Parasites  · Irritable bowel syndrome (IBS)  · Inflammatory bowel diseases such as ulcerative colitis, Crohn's disease, and celiac disease  · Food intolerance, such as to lactose, the sugar found in milk and milk products  · Reaction to medicines like antibiotics, laxatives, cancer drugs, and antacids  Along with diarrhea, you may also have:  · Abdominal pain and cramping  · Nausea and vomiting  · Loss of bowel control  · Fever and chills  · Bloody stools  In some cases, antibiotics may help to treat diarrhea. You may have a stool sample test. This is done to see what is causing your diarrhea, and if antibiotics will help treat it. The results of a stool sample test may take up to 2 days. The healthcare provider may not give you antibiotics until he or she has the stool test results.  Diarrhea can cause dehydration. This is the loss of too much water and other fluids from the body. When this occurs, body fluid must be replaced. This can be done with oral rehydration solutions. Oral rehydration solutions are available at drugstores and grocery stores without a prescription.  Home care  Follow all instructions given by your healthcare provider. Rest at home for the next 24 hours, or until you feel better. Avoid caffeine, tobacco, and  alcohol. These can make diarrhea, cramping, and pain worse.  If taking medicines:  · Dont take over-the-counter diarrhea or nausea medicines unless your healthcare provider tells you to.  · You may use acetaminophen or NSAID medicines like ibuprofen or naproxen to reduce pain and fever. Dont use these if you have chronic liver or kidney disease, or ever had a stomach ulcer or gastrointestinal bleeding. Don't use NSAID medicines if you are already taking one for another condition (like arthritis) or are on daily aspirin therapy (such as for heart disease or after a stroke). Talk with your healthcare provider first.  · If antibiotics were prescribed, be sure you take them until they are finished. Dont stop taking them even when you feel better. Antibiotics must be taken as a full course.  To prevent the spread of illness:  · Remember that washing with soap and water and using alcohol-based  is the best way to prevent the spread of infection.  · Clean the toilet after each use.  · Wash your hands before eating.  · Wash your hands before and after preparing food. Keep in mind that people with diarrhea or vomiting should not prepare food for others.  · Wash your hands after using cutting boards, countertops, and knives that have been in contact with raw foods.  · Wash and then peel fruits and vegetables.  · Keep uncooked meats away from cooked and ready-to-eat foods.  · Use a food thermometer when cooking. Cook poultry to at least 165°F (74°C). Cook ground meat (beef, veal, pork, lamb) to at least 160°F (71°C). Cook fresh beef, veal, lamb, and pork to at least 145°F (63°C).  · Dont eat raw or undercooked eggs (poached or paula side up), poultry, meat, or unpasteurized milk and juices.  Food and drinks  The main goal while treating vomiting or diarrhea is to prevent dehydration. This is done by taking small amounts of liquids often.  · Keep in mind that liquids are more important than food right now.  · Drink  only small amounts of liquids at a time.  · Dont force yourself to eat, especially if you are having cramping, vomiting, or diarrhea. Dont eat large amounts at a time, even if you are hungry.  · If you eat, avoid fatty, greasy, spicy, or fried foods.  · Dont eat dairy foods or drink milk if you have diarrhea. These can make diarrhea worse.  During the first 24 hours you can try:  · Oral rehydration solutions. Do not use sports drinks. They have too much sugar and not enough electrolytes.  · Soft drinks without caffeine  · Ginger ale  · Water (plain or flavored)  · Decaf tea or coffee  · Clear broth, consommé, or bouillon  · Gelatin, popsicles, or frozen fruit juice bars  The second 24 hours, if you are feeling better, you can add:  · Hot cereal, plain toast, bread, rolls, or crackers  · Plain noodles, rice, mashed potatoes, chicken noodle soup, or rice soup  · Unsweetened canned fruit (no pineapple)  · Bananas  As you recover:  · Limit fat intake to less than 15 grams per day. Dont eat margarine, butter, oils, mayonnaise, sauces, gravies, fried foods, peanut butter, meat, poultry, or fish.  · Limit fiber. Dont eat raw or cooked vegetables, fresh fruits except bananas, or bran cereals.  · Limit caffeine and chocolate.  · Limit dairy.  · Dont use spices or seasonings except salt.  · Go back to your normal diet over time, as you feel better and your symptoms improve.  · If the symptoms come back, go back to a simple diet or clear liquids.  Follow-up care  Follow up with your healthcare provider, or as advised. If a stool sample was taken or cultures were done, call the healthcare provider for the results as instructed.  Call 911  Call 911 if you have any of these symptoms:  · Trouble breathing  · Confusion  · Extreme drowsiness or trouble walking  · Loss of consciousness  · Rapid heart rate  · Chest pain  · Stiff neck  · Seizure  When to seek medical advice  Call your healthcare provider right away if any of  these occur:  · Abdominal pain that gets worse  · Constant lower right abdominal pain  · Continued vomiting and inability to keep liquids down  · Diarrhea more than 5 times a day  · Blood in vomit or stool  · Dark urine or no urine for 8 hours, dry mouth and tongue, tiredness, weakness, or dizziness  · Drowsiness  · New rash  · You dont get better in 2 to 3 days  · Fever of 100.4°F (38°C) or higher that doesnt get lower with medicine  Date Last Reviewed: 1/3/2016  © 5302-6841 [employer]. [address] [place] [id number]. All rights reserved. This information is not intended as a substitute for professional medical care. Always follow your healthcare professional's instructions.

## 2020-12-27 ENCOUNTER — TELEPHONE (OUTPATIENT)
Dept: URGENT CARE | Facility: CLINIC | Age: 50
End: 2020-12-27

## 2020-12-27 ENCOUNTER — CLINICAL SUPPORT (OUTPATIENT)
Dept: URGENT CARE | Facility: CLINIC | Age: 50
End: 2020-12-27
Payer: COMMERCIAL

## 2020-12-27 DIAGNOSIS — U07.1 COVID-19 VIRUS DETECTED: ICD-10-CM

## 2020-12-27 DIAGNOSIS — Z11.9 SCREENING EXAMINATION FOR UNSPECIFIED INFECTIOUS DISEASE: Primary | ICD-10-CM

## 2020-12-27 LAB
CTP QC/QA: YES
SARS-COV-2 RDRP RESP QL NAA+PROBE: POSITIVE

## 2020-12-27 PROCEDURE — 99211 OFF/OP EST MAY X REQ PHY/QHP: CPT | Mod: S$GLB,,, | Performed by: FAMILY MEDICINE

## 2020-12-27 PROCEDURE — U0002 COVID-19 LAB TEST NON-CDC: HCPCS | Mod: QW,S$GLB,, | Performed by: FAMILY MEDICINE

## 2020-12-27 PROCEDURE — U0002: ICD-10-PCS | Mod: QW,S$GLB,, | Performed by: FAMILY MEDICINE

## 2020-12-27 PROCEDURE — 99211 PR OFFICE/OUTPT VISIT, EST, LEVL I: ICD-10-PCS | Mod: S$GLB,,, | Performed by: FAMILY MEDICINE

## 2020-12-27 NOTE — PATIENT INSTRUCTIONS
POSITIVE COVID TEST    You have tested positive for COVID-19 today. Please note that patients who test positive for COVID-19 are required by the CDC to undergo isolation for 10 days after their symptoms first began.    This isolation starts from the day you first developed symptoms, not the day of your positive test. For example, if your symptoms began on a Monday but tested positive on the following Wednesday, your 10-day isolation begins from that Monday, not the Wednesday you tested positive.    However, if you are asymptomatic (a person who does not have any symptoms) and COVID-19 positive, your 10-day isolation begins on the day you tested positive, regardless of exposure date.    Also, per the CDC guidelines, once your 10 days have passed, and you have not had fever greater than 100.4F in the last 24 hours without taking any fever reducers such as Tylenol (Acetaminophen) or Motrin (Ibuprofen), you may return to your normal activities including social distancing, wearing masks, and frequent handwashing - YOU DO NOT NEED ANOTHER TEST IN ORDER TO END YOUR QUARANTINE.       CDC Testing and Quarantine Guidelines for patients with exposure to a known-positive COVID-19 person:    A close exposure is defined as anyone who has had an exposure (masked or unmasked) to a known COVID -19 positive person within 6 ft for longer than 15 minutes. If your exposure meets this definition you are required by CDC guidelines to quarantine for at least 7-10 days from time of exposure. The CDC states that a test can be performed for an asymptomatic patient (someone who does not have any symptoms) after a close exposure, and that a test should be done if you develop symptoms after a close exposure as described above. Specifically, you can test at day 5 or later if asymptomatic, in order to get released from quarantine on day 7 or later. If you develop symptoms sooner, you should test when your symptoms start.    If you  meet the definition of a close exposure, it will not matter whether you are experiencing symptoms- a quarantine for at least 7-10 days after a close exposure is required by CDC guidelines.    Please note, if you decide to test as an asymptomatic during your quarantine and you are positive, you will be restarting your quarantine and moving from a possible 10 day quarantine (if you do not test), to a 11 day or greater quarantine.    The CDC also suggests people still monitor for symptoms for a full 14 days and remember that the shorter quarantine options do not replace initial CDC guidance. The CDC continues to recommend quarantining for 14 days as the best way to reduce risk for spreading COVID-19 - however, this is only a recommendation.    If your exposure does not meet the above definition, you can return to your normal daily activities to include social distancing, wearing a mask and frequent handwashing.

## 2020-12-27 NOTE — PROGRESS NOTES
Subjective:       Patient ID: Jose Luis Burr is a 50 y.o. male.    Chief Complaint: Asy Covid Test    CDC TESTING AND QUARANTINE GUIDELINES FOR EXPOSURE   A CLOSE EXPOSURE IS DEFINED AS ANYONE WHO HAD A MASKED OR AN UNMASKED EXPOSURE TO A KNOWN  19 POSITIVE PERSON, AT LESS THAN 6 FT FOR MORE THAN 15 MINUTES. IF YOUR EXPOSURE MEETS THIS DEFINITION, THEN YOU ARE REQUIRED TO QUARANTINE FOR 14 DAYS PER THE CDC. THEY RECOMMEND THAT A TEST CAN BE PERFORMED IF YOU ASYMPTOMATIC.(SOMEONE WHO DOES NOT HAVE ANY SYMPTOMS), AND A TEST SHOULD BE DONE IF YOU DEVELOP SYMPTOMS AFTER AN EXPOSURE AS DESCRIBED ABOVE.    IF YOU MEET THE DEFINITION OF A CLOSE EXPOSURE, IT DOES NOT MATTER WHETHER OR NOT YOU ARE ASYMPTOMATIC OR SYMPTOMATIC- A NEGATIVE TEST DOES NOT GET YOU OUT OF 14 DAYS OF QUARANTINE!    PLEASE NOTE THAT IF YOU ARE ASYMPTOMATIC AND WAIT MORE THAN 4 DAYS TO TEST AFTER AN EXPOSURE, YOU RISK LENGTHENING YOUR QUARANTINE.THIS IS BECAUSE IF YOU TEST POSITIVE AS AN ASYMPTOMATIC, YOUR ISOLATION IS 10 DAYS FROM THE DATE OF THE POSITIVE TEST, NOT THE DATE OF EXPOSURE. SO FOR EXAMPLE, IF YOU TEST POSITIVE AS AN ASYMPTOMATIC ON DAY 7 FROM EXPOSURE, YOU HAVE NOW EXTENDED YOUR 14 DAY QUARANTINE TO A  17 DAY ISOLAION    IF YOUR EXPOSURE DOES NOT MEET THE ABOVE DEFINITION, YOU MAY RETURN TO YOUR NORMAL ACTIVITIES INCLUDING SOCIAL DISTANCING, WEARING MASKS, AND FREQUENT HANDWASHING      ROS     Objective:      Physical Exam    Assessment:       1. Screening examination for unspecified infectious disease        Plan:                   No follow-ups on file.

## 2020-12-27 NOTE — TELEPHONE ENCOUNTER
I spoke with patient and reported positive COVID swab result.  Reviewed isolation precautions for self, symptomatic care, as well as quarantine recommendations for close contact exposures.  All questions answered.

## 2021-01-03 ENCOUNTER — TELEPHONE (OUTPATIENT)
Dept: URGENT CARE | Facility: CLINIC | Age: 51
End: 2021-01-03

## 2021-01-13 ENCOUNTER — HOSPITAL ENCOUNTER (OUTPATIENT)
Dept: RADIOLOGY | Facility: HOSPITAL | Age: 51
Discharge: HOME | End: 2021-01-13
Attending: INTERNAL MEDICINE
Payer: COMMERCIAL

## 2021-01-13 ENCOUNTER — OFFICE VISIT (OUTPATIENT)
Dept: INTERNAL MEDICINE | Facility: CLINIC | Age: 51
End: 2021-01-13
Payer: COMMERCIAL

## 2021-01-13 VITALS
HEART RATE: 71 BPM | DIASTOLIC BLOOD PRESSURE: 84 MMHG | WEIGHT: 252.88 LBS | TEMPERATURE: 98 F | SYSTOLIC BLOOD PRESSURE: 144 MMHG | HEIGHT: 74 IN | BODY MASS INDEX: 32.45 KG/M2 | OXYGEN SATURATION: 97 %

## 2021-01-13 DIAGNOSIS — R10.31 RIGHT LOWER QUADRANT PAIN: ICD-10-CM

## 2021-01-13 DIAGNOSIS — R10.9 FLANK PAIN: Primary | ICD-10-CM

## 2021-01-13 LAB
BILIRUB SERPL-MCNC: NORMAL MG/DL
BLOOD URINE, POC: NORMAL
CLARITY, POC UA: NORMAL
COLOR, POC UA: NORMAL
CREAT SERPL-MCNC: 1.1 MG/DL (ref 0.5–1.4)
GLUCOSE UR QL STRIP: NORMAL
KETONES UR QL STRIP: NORMAL
LEUKOCYTE ESTERASE URINE, POC: NORMAL
NITRITE, POC UA: NORMAL
PH, POC UA: 8
PROTEIN, POC: NORMAL
SAMPLE: NORMAL
SPECIFIC GRAVITY, POC UA: 1
UROBILINOGEN, POC UA: NORMAL

## 2021-01-13 PROCEDURE — 3079F DIAST BP 80-89 MM HG: CPT | Mod: CPTII,S$GLB,, | Performed by: INTERNAL MEDICINE

## 2021-01-13 PROCEDURE — 3079F PR MOST RECENT DIASTOLIC BLOOD PRESSURE 80-89 MM HG: ICD-10-PCS | Mod: CPTII,S$GLB,, | Performed by: INTERNAL MEDICINE

## 2021-01-13 PROCEDURE — 3008F BODY MASS INDEX DOCD: CPT | Mod: CPTII,S$GLB,, | Performed by: INTERNAL MEDICINE

## 2021-01-13 PROCEDURE — 81002 URINALYSIS NONAUTO W/O SCOPE: CPT | Mod: S$GLB,,, | Performed by: INTERNAL MEDICINE

## 2021-01-13 PROCEDURE — 3077F PR MOST RECENT SYSTOLIC BLOOD PRESSURE >= 140 MM HG: ICD-10-PCS | Mod: CPTII,S$GLB,, | Performed by: INTERNAL MEDICINE

## 2021-01-13 PROCEDURE — 1125F PR PAIN SEVERITY QUANTIFIED, PAIN PRESENT: ICD-10-PCS | Mod: S$GLB,,, | Performed by: INTERNAL MEDICINE

## 2021-01-13 PROCEDURE — 99999 PR PBB SHADOW E&M-EST. PATIENT-LVL IV: ICD-10-PCS | Mod: PBBFAC,,, | Performed by: INTERNAL MEDICINE

## 2021-01-13 PROCEDURE — 74177 CT ABD & PELVIS W/CONTRAST: CPT | Mod: 26,,, | Performed by: RADIOLOGY

## 2021-01-13 PROCEDURE — 81002 POCT URINE DIPSTICK WITHOUT MICROSCOPE: ICD-10-PCS | Mod: S$GLB,,, | Performed by: INTERNAL MEDICINE

## 2021-01-13 PROCEDURE — 99999 PR PBB SHADOW E&M-EST. PATIENT-LVL IV: CPT | Mod: PBBFAC,,, | Performed by: INTERNAL MEDICINE

## 2021-01-13 PROCEDURE — 99214 PR OFFICE/OUTPT VISIT, EST, LEVL IV, 30-39 MIN: ICD-10-PCS | Mod: S$GLB,,, | Performed by: INTERNAL MEDICINE

## 2021-01-13 PROCEDURE — 74177 CT ABD & PELVIS W/CONTRAST: CPT | Mod: TC

## 2021-01-13 PROCEDURE — 3077F SYST BP >= 140 MM HG: CPT | Mod: CPTII,S$GLB,, | Performed by: INTERNAL MEDICINE

## 2021-01-13 PROCEDURE — 25500020 PHARM REV CODE 255: Performed by: INTERNAL MEDICINE

## 2021-01-13 PROCEDURE — 99214 OFFICE O/P EST MOD 30 MIN: CPT | Mod: S$GLB,,, | Performed by: INTERNAL MEDICINE

## 2021-01-13 PROCEDURE — 3008F PR BODY MASS INDEX (BMI) DOCUMENTED: ICD-10-PCS | Mod: CPTII,S$GLB,, | Performed by: INTERNAL MEDICINE

## 2021-01-13 PROCEDURE — 1125F AMNT PAIN NOTED PAIN PRSNT: CPT | Mod: S$GLB,,, | Performed by: INTERNAL MEDICINE

## 2021-01-13 PROCEDURE — 74177 CT ABDOMEN PELVIS WITH CONTRAST: ICD-10-PCS | Mod: 26,,, | Performed by: RADIOLOGY

## 2021-01-13 RX ORDER — CIPROFLOXACIN 500 MG/1
500 TABLET ORAL 2 TIMES DAILY
Qty: 20 TABLET | Refills: 0 | Status: SHIPPED | OUTPATIENT
Start: 2021-01-13 | End: 2021-01-23

## 2021-01-13 RX ADMIN — IOHEXOL 15 ML: 350 INJECTION, SOLUTION INTRAVENOUS at 05:01

## 2021-01-13 RX ADMIN — IOHEXOL 100 ML: 350 INJECTION, SOLUTION INTRAVENOUS at 05:01

## 2021-01-14 ENCOUNTER — PATIENT MESSAGE (OUTPATIENT)
Dept: INTERNAL MEDICINE | Facility: CLINIC | Age: 51
End: 2021-01-14

## 2021-03-01 ENCOUNTER — OFFICE VISIT (OUTPATIENT)
Dept: INTERNAL MEDICINE | Facility: CLINIC | Age: 51
End: 2021-03-01
Payer: COMMERCIAL

## 2021-03-01 VITALS
DIASTOLIC BLOOD PRESSURE: 90 MMHG | SYSTOLIC BLOOD PRESSURE: 142 MMHG | HEART RATE: 72 BPM | WEIGHT: 262.56 LBS | BODY MASS INDEX: 33.7 KG/M2 | OXYGEN SATURATION: 96 % | HEIGHT: 74 IN

## 2021-03-01 DIAGNOSIS — R91.1 SOLITARY PULMONARY NODULE: ICD-10-CM

## 2021-03-01 DIAGNOSIS — Z13.9 ENCOUNTER FOR SCREENING: ICD-10-CM

## 2021-03-01 DIAGNOSIS — K52.9 INFLAMMATORY BOWEL DISEASE: ICD-10-CM

## 2021-03-01 DIAGNOSIS — I10 ESSENTIAL HYPERTENSION: Primary | ICD-10-CM

## 2021-03-01 PROCEDURE — 3078F DIAST BP <80 MM HG: CPT | Mod: CPTII,S$GLB,, | Performed by: STUDENT IN AN ORGANIZED HEALTH CARE EDUCATION/TRAINING PROGRAM

## 2021-03-01 PROCEDURE — 99214 PR OFFICE/OUTPT VISIT, EST, LEVL IV, 30-39 MIN: ICD-10-PCS | Mod: S$GLB,,, | Performed by: STUDENT IN AN ORGANIZED HEALTH CARE EDUCATION/TRAINING PROGRAM

## 2021-03-01 PROCEDURE — 3077F PR MOST RECENT SYSTOLIC BLOOD PRESSURE >= 140 MM HG: ICD-10-PCS | Mod: CPTII,S$GLB,, | Performed by: STUDENT IN AN ORGANIZED HEALTH CARE EDUCATION/TRAINING PROGRAM

## 2021-03-01 PROCEDURE — 3077F SYST BP >= 140 MM HG: CPT | Mod: CPTII,S$GLB,, | Performed by: STUDENT IN AN ORGANIZED HEALTH CARE EDUCATION/TRAINING PROGRAM

## 2021-03-01 PROCEDURE — 99999 PR PBB SHADOW E&M-EST. PATIENT-LVL IV: CPT | Mod: PBBFAC,,, | Performed by: STUDENT IN AN ORGANIZED HEALTH CARE EDUCATION/TRAINING PROGRAM

## 2021-03-01 PROCEDURE — 3078F PR MOST RECENT DIASTOLIC BLOOD PRESSURE < 80 MM HG: ICD-10-PCS | Mod: CPTII,S$GLB,, | Performed by: STUDENT IN AN ORGANIZED HEALTH CARE EDUCATION/TRAINING PROGRAM

## 2021-03-01 PROCEDURE — 99999 PR PBB SHADOW E&M-EST. PATIENT-LVL IV: ICD-10-PCS | Mod: PBBFAC,,, | Performed by: STUDENT IN AN ORGANIZED HEALTH CARE EDUCATION/TRAINING PROGRAM

## 2021-03-01 PROCEDURE — 99214 OFFICE O/P EST MOD 30 MIN: CPT | Mod: S$GLB,,, | Performed by: STUDENT IN AN ORGANIZED HEALTH CARE EDUCATION/TRAINING PROGRAM

## 2021-03-01 RX ORDER — AMLODIPINE BESYLATE 5 MG/1
5 TABLET ORAL DAILY
Qty: 30 TABLET | Refills: 11 | Status: SHIPPED | OUTPATIENT
Start: 2021-03-01 | End: 2021-05-07

## 2021-03-05 ENCOUNTER — LAB VISIT (OUTPATIENT)
Dept: LAB | Facility: HOSPITAL | Age: 51
End: 2021-03-05
Attending: STUDENT IN AN ORGANIZED HEALTH CARE EDUCATION/TRAINING PROGRAM
Payer: COMMERCIAL

## 2021-03-05 DIAGNOSIS — Z13.9 ENCOUNTER FOR SCREENING: ICD-10-CM

## 2021-03-05 DIAGNOSIS — I10 ESSENTIAL HYPERTENSION: ICD-10-CM

## 2021-03-05 LAB
ANION GAP SERPL CALC-SCNC: 5 MMOL/L (ref 8–16)
BUN SERPL-MCNC: 21 MG/DL (ref 6–20)
CALCIUM SERPL-MCNC: 9.3 MG/DL (ref 8.7–10.5)
CHLORIDE SERPL-SCNC: 106 MMOL/L (ref 95–110)
CHOLEST SERPL-MCNC: 155 MG/DL (ref 120–199)
CHOLEST/HDLC SERPL: 5.2 {RATIO} (ref 2–5)
CO2 SERPL-SCNC: 27 MMOL/L (ref 23–29)
COMPLEXED PSA SERPL-MCNC: 2.1 NG/ML (ref 0–4)
CREAT SERPL-MCNC: 1 MG/DL (ref 0.5–1.4)
EST. GFR  (AFRICAN AMERICAN): >60 ML/MIN/1.73 M^2
EST. GFR  (NON AFRICAN AMERICAN): >60 ML/MIN/1.73 M^2
GLUCOSE SERPL-MCNC: 99 MG/DL (ref 70–110)
HDLC SERPL-MCNC: 30 MG/DL (ref 40–75)
HDLC SERPL: 19.4 % (ref 20–50)
LDLC SERPL CALC-MCNC: 107.6 MG/DL (ref 63–159)
NONHDLC SERPL-MCNC: 125 MG/DL
POTASSIUM SERPL-SCNC: 4 MMOL/L (ref 3.5–5.1)
SODIUM SERPL-SCNC: 138 MMOL/L (ref 136–145)
TRIGL SERPL-MCNC: 87 MG/DL (ref 30–150)

## 2021-03-05 PROCEDURE — 36415 COLL VENOUS BLD VENIPUNCTURE: CPT | Performed by: STUDENT IN AN ORGANIZED HEALTH CARE EDUCATION/TRAINING PROGRAM

## 2021-03-05 PROCEDURE — 84153 ASSAY OF PSA TOTAL: CPT | Performed by: STUDENT IN AN ORGANIZED HEALTH CARE EDUCATION/TRAINING PROGRAM

## 2021-03-05 PROCEDURE — 80061 LIPID PANEL: CPT | Performed by: STUDENT IN AN ORGANIZED HEALTH CARE EDUCATION/TRAINING PROGRAM

## 2021-03-05 PROCEDURE — 80048 BASIC METABOLIC PNL TOTAL CA: CPT | Performed by: STUDENT IN AN ORGANIZED HEALTH CARE EDUCATION/TRAINING PROGRAM

## 2021-03-08 ENCOUNTER — PATIENT MESSAGE (OUTPATIENT)
Dept: HEPATOLOGY | Facility: CLINIC | Age: 51
End: 2021-03-08

## 2021-03-26 ENCOUNTER — IMMUNIZATION (OUTPATIENT)
Dept: PRIMARY CARE CLINIC | Facility: CLINIC | Age: 51
End: 2021-03-26
Payer: COMMERCIAL

## 2021-03-26 DIAGNOSIS — Z23 NEED FOR VACCINATION: Primary | ICD-10-CM

## 2021-03-26 PROCEDURE — 91300 PR SARS-COV- 2 COVID-19 VACCINE, NO PRSV, 30MCG/0.3ML, IM: CPT | Mod: S$GLB,,, | Performed by: INTERNAL MEDICINE

## 2021-03-26 PROCEDURE — 0001A PR IMMUNIZ ADMIN, SARS-COV-2 COVID-19 VACC, 30MCG/0.3ML, 1ST DOSE: CPT | Mod: CV19,S$GLB,, | Performed by: INTERNAL MEDICINE

## 2021-03-26 PROCEDURE — 0001A PR IMMUNIZ ADMIN, SARS-COV-2 COVID-19 VACC, 30MCG/0.3ML, 1ST DOSE: ICD-10-PCS | Mod: CV19,S$GLB,, | Performed by: INTERNAL MEDICINE

## 2021-03-26 PROCEDURE — 91300 PR SARS-COV- 2 COVID-19 VACCINE, NO PRSV, 30MCG/0.3ML, IM: ICD-10-PCS | Mod: S$GLB,,, | Performed by: INTERNAL MEDICINE

## 2021-03-26 RX ADMIN — Medication 0.3 ML: at 03:03

## 2021-04-14 ENCOUNTER — OFFICE VISIT (OUTPATIENT)
Dept: INTERNAL MEDICINE | Facility: CLINIC | Age: 51
End: 2021-04-14
Payer: COMMERCIAL

## 2021-04-14 VITALS
WEIGHT: 261.25 LBS | HEART RATE: 63 BPM | DIASTOLIC BLOOD PRESSURE: 88 MMHG | SYSTOLIC BLOOD PRESSURE: 133 MMHG | BODY MASS INDEX: 33.53 KG/M2 | HEIGHT: 74 IN | OXYGEN SATURATION: 96 %

## 2021-04-14 DIAGNOSIS — I10 ESSENTIAL HYPERTENSION: Primary | ICD-10-CM

## 2021-04-14 PROCEDURE — 99213 OFFICE O/P EST LOW 20 MIN: CPT | Mod: S$GLB,,, | Performed by: STUDENT IN AN ORGANIZED HEALTH CARE EDUCATION/TRAINING PROGRAM

## 2021-04-14 PROCEDURE — 99999 PR PBB SHADOW E&M-EST. PATIENT-LVL III: ICD-10-PCS | Mod: PBBFAC,,, | Performed by: STUDENT IN AN ORGANIZED HEALTH CARE EDUCATION/TRAINING PROGRAM

## 2021-04-14 PROCEDURE — 99213 PR OFFICE/OUTPT VISIT, EST, LEVL III, 20-29 MIN: ICD-10-PCS | Mod: S$GLB,,, | Performed by: STUDENT IN AN ORGANIZED HEALTH CARE EDUCATION/TRAINING PROGRAM

## 2021-04-14 PROCEDURE — 99999 PR PBB SHADOW E&M-EST. PATIENT-LVL III: CPT | Mod: PBBFAC,,, | Performed by: STUDENT IN AN ORGANIZED HEALTH CARE EDUCATION/TRAINING PROGRAM

## 2021-04-18 ENCOUNTER — IMMUNIZATION (OUTPATIENT)
Dept: PRIMARY CARE CLINIC | Facility: CLINIC | Age: 51
End: 2021-04-18
Payer: COMMERCIAL

## 2021-04-18 DIAGNOSIS — Z23 NEED FOR VACCINATION: Primary | ICD-10-CM

## 2021-04-18 PROCEDURE — 91300 PR SARS-COV- 2 COVID-19 VACCINE, NO PRSV, 30MCG/0.3ML, IM: CPT | Mod: S$GLB,,, | Performed by: INTERNAL MEDICINE

## 2021-04-18 PROCEDURE — 91300 PR SARS-COV- 2 COVID-19 VACCINE, NO PRSV, 30MCG/0.3ML, IM: ICD-10-PCS | Mod: S$GLB,,, | Performed by: INTERNAL MEDICINE

## 2021-04-18 PROCEDURE — 0002A PR IMMUNIZ ADMIN, SARS-COV-2 COVID-19 VACC, 30MCG/0.3ML, 2ND DOSE: ICD-10-PCS | Mod: CV19,S$GLB,, | Performed by: INTERNAL MEDICINE

## 2021-04-18 PROCEDURE — 0002A PR IMMUNIZ ADMIN, SARS-COV-2 COVID-19 VACC, 30MCG/0.3ML, 2ND DOSE: CPT | Mod: CV19,S$GLB,, | Performed by: INTERNAL MEDICINE

## 2021-04-18 RX ADMIN — Medication 0.3 ML: at 11:04

## 2021-05-06 ENCOUNTER — PATIENT MESSAGE (OUTPATIENT)
Dept: INTERNAL MEDICINE | Facility: CLINIC | Age: 51
End: 2021-05-06

## 2021-05-07 DIAGNOSIS — I10 ESSENTIAL HYPERTENSION: Primary | ICD-10-CM

## 2021-05-07 RX ORDER — AMLODIPINE BESYLATE 10 MG/1
10 TABLET ORAL DAILY
Qty: 90 TABLET | Refills: 3 | Status: SHIPPED | OUTPATIENT
Start: 2021-05-07 | End: 2022-05-07

## 2021-08-27 ENCOUNTER — TELEPHONE (OUTPATIENT)
Dept: DERMATOLOGY | Facility: CLINIC | Age: 51
End: 2021-08-27

## 2021-09-16 ENCOUNTER — OFFICE VISIT (OUTPATIENT)
Dept: DERMATOLOGY | Facility: CLINIC | Age: 51
End: 2021-09-16
Payer: COMMERCIAL

## 2021-09-16 DIAGNOSIS — L82.1 SK (SEBORRHEIC KERATOSIS): Primary | ICD-10-CM

## 2021-09-16 DIAGNOSIS — D22.9 BENIGN MOLE: ICD-10-CM

## 2021-09-16 PROCEDURE — 1160F PR REVIEW ALL MEDS BY PRESCRIBER/CLIN PHARMACIST DOCUMENTED: ICD-10-PCS | Mod: CPTII,S$GLB,, | Performed by: DERMATOLOGY

## 2021-09-16 PROCEDURE — 99203 PR OFFICE/OUTPT VISIT, NEW, LEVL III, 30-44 MIN: ICD-10-PCS | Mod: S$GLB,,, | Performed by: DERMATOLOGY

## 2021-09-16 PROCEDURE — 1160F RVW MEDS BY RX/DR IN RCRD: CPT | Mod: CPTII,S$GLB,, | Performed by: DERMATOLOGY

## 2021-09-16 PROCEDURE — 99999 PR PBB SHADOW E&M-EST. PATIENT-LVL II: CPT | Mod: PBBFAC,,, | Performed by: DERMATOLOGY

## 2021-09-16 PROCEDURE — 99203 OFFICE O/P NEW LOW 30 MIN: CPT | Mod: S$GLB,,, | Performed by: DERMATOLOGY

## 2021-09-16 PROCEDURE — 1159F PR MEDICATION LIST DOCUMENTED IN MEDICAL RECORD: ICD-10-PCS | Mod: CPTII,S$GLB,, | Performed by: DERMATOLOGY

## 2021-09-16 PROCEDURE — 99999 PR PBB SHADOW E&M-EST. PATIENT-LVL II: ICD-10-PCS | Mod: PBBFAC,,, | Performed by: DERMATOLOGY

## 2021-09-16 PROCEDURE — 1159F MED LIST DOCD IN RCRD: CPT | Mod: CPTII,S$GLB,, | Performed by: DERMATOLOGY

## 2022-05-03 ENCOUNTER — PATIENT MESSAGE (OUTPATIENT)
Dept: RESEARCH | Facility: HOSPITAL | Age: 52
End: 2022-05-03
Payer: COMMERCIAL

## 2022-09-06 NOTE — INTERVAL H&P NOTE
The patient has been examined and the H&P has been reviewed:    I concur with the findings and no changes have occurred since H&P was written.    Anesthesia/Surgery risks, benefits and alternative options discussed and understood by patient/family.          Active Hospital Problems    Diagnosis  POA    Left varicocele [I86.1]  Yes      Resolved Hospital Problems    Diagnosis Date Resolved POA   No resolved problems to display.      Pt presents with decreased strength, endurance, and balance, all impacting ability to perform ADLs and functional mobility.

## 2023-06-08 ENCOUNTER — OFFICE VISIT (OUTPATIENT)
Dept: UROLOGY | Facility: CLINIC | Age: 53
End: 2023-06-08
Payer: COMMERCIAL

## 2023-06-08 DIAGNOSIS — M54.50 LOW BACK PAIN, UNSPECIFIED BACK PAIN LATERALITY, UNSPECIFIED CHRONICITY, UNSPECIFIED WHETHER SCIATICA PRESENT: ICD-10-CM

## 2023-06-08 DIAGNOSIS — R10.9 FLANK PAIN: Primary | ICD-10-CM

## 2023-06-08 DIAGNOSIS — N50.812 PAIN IN LEFT TESTICLE: ICD-10-CM

## 2023-06-08 PROCEDURE — 99204 PR OFFICE/OUTPT VISIT, NEW, LEVL IV, 45-59 MIN: ICD-10-PCS | Mod: S$GLB,,,

## 2023-06-08 PROCEDURE — 1159F PR MEDICATION LIST DOCUMENTED IN MEDICAL RECORD: ICD-10-PCS | Mod: CPTII,S$GLB,,

## 2023-06-08 PROCEDURE — 1160F RVW MEDS BY RX/DR IN RCRD: CPT | Mod: CPTII,S$GLB,,

## 2023-06-08 PROCEDURE — 99204 OFFICE O/P NEW MOD 45 MIN: CPT | Mod: S$GLB,,,

## 2023-06-08 PROCEDURE — 1159F MED LIST DOCD IN RCRD: CPT | Mod: CPTII,S$GLB,,

## 2023-06-08 PROCEDURE — 99999 PR PBB SHADOW E&M-EST. PATIENT-LVL II: ICD-10-PCS | Mod: PBBFAC,,,

## 2023-06-08 PROCEDURE — 99999 PR PBB SHADOW E&M-EST. PATIENT-LVL II: CPT | Mod: PBBFAC,,,

## 2023-06-08 PROCEDURE — 1160F PR REVIEW ALL MEDS BY PRESCRIBER/CLIN PHARMACIST DOCUMENTED: ICD-10-PCS | Mod: CPTII,S$GLB,,

## 2023-06-08 NOTE — PROGRESS NOTES
CHIEF COMPLAINT:  Back pain, testicle pain       HISTORY OF PRESENTING ILLINESS:  Jose Luis Burr is a 52 y.o. male re-establishing care with urology. He presents today c/o severe lower back pain that radiated to L side, L groin and L testicle over the weekend. Pain rating was 8-10/10. He has increased his water intake and has added lemon, also had been drinking apple cider vinegar. Denies weak FOS, frequency, urgency, hesitancy.  He is not experiencing pain today. No concern for STD exposure. States he has had chills the past 2 nights at home, did not take temperature. PMHx includes diverticulosis, HTN, ulcerative colitis. No hx of kidney stones, no GH. Negative family hx  cancers.        REVIEW OF SYSTEMS:  Review of Systems   Constitutional:  Positive for chills. Negative for fever.   HENT:  Negative for congestion and sore throat.    Respiratory:  Negative for cough and shortness of breath.    Cardiovascular:  Negative for chest pain and palpitations.   Gastrointestinal:  Positive for abdominal pain (L upper quad). Negative for nausea and vomiting.   Genitourinary:  Negative for dysuria, flank pain (L), frequency, hematuria and urgency.   Musculoskeletal:  Positive for back pain.   Neurological:  Negative for dizziness and headaches.       PATIENT HISTORY:  Past Medical History:   Diagnosis Date    Diverticulosis     Hypertension     Ulcerative colitis        Past Surgical History:   Procedure Laterality Date    COLONOSCOPY N/A 4/9/2018    Procedure: COLONOSCOPY;  Surgeon: FRANKY Hdz MD;  Location: [facility] (4TH FLR);  Service: Endoscopy;  Laterality: N/A;    TONSILLECTOMY      VARICOCELECTOMY Left 6/26/2018    Procedure: VARICOCELECTOMY;  Surgeon: Diego Arnett MD;  Location: [facility] 2ND FLR;  Service: Urology;  Laterality: Left;  1.5 hours       Family History   Problem Relation Age of Onset    Heart disease Father     Stroke Father     Heart disease Mother     Hypertension Sister     Colon cancer  Neg Hx     Esophageal cancer Neg Hx        Social History     Socioeconomic History    Marital status: Single   Occupational History     Employer: OTHER     Comment:    Tobacco Use    Smoking status: Former     Types: Cigarettes    Smokeless tobacco: Never   Substance and Sexual Activity    Alcohol use: Yes     Comment: socially    Drug use: Not Currently     Types: Marijuana    Sexual activity: Yes     Partners: Female       Allergies:  Patient has no known allergies.    Medications:    Current Outpatient Medications:     amLODIPine (NORVASC) 10 MG tablet, Take 1 tablet (10 mg total) by mouth once daily., Disp: 90 tablet, Rfl: 3    Lactobac no.41/Bifidobact no.7 (PROBIOTIC-10 ORAL), Take by mouth., Disp: , Rfl:     multivitamin (THERAGRAN) per tablet, Take 1 tablet by mouth once daily., Disp: , Rfl:     PHYSICAL EXAMINATION:  Physical Exam  Constitutional:       Appearance: Normal appearance.   HENT:      Head: Normocephalic and atraumatic.      Right Ear: External ear normal.      Left Ear: External ear normal.   Pulmonary:      Effort: Pulmonary effort is normal. No respiratory distress.   Abdominal:      Hernia: There is no hernia in the left inguinal area or right inguinal area.   Genitourinary:     Penis: Normal and circumcised.       Testes: Normal.      Epididymis:      Right: Normal.      Left: Normal.      Prostate: Enlarged (30 g). Not tender and no nodules present.      Rectum: External hemorrhoid present. No mass, tenderness, anal fissure or internal hemorrhoid. Normal anal tone.   Lymphadenopathy:      Lower Body: No right inguinal adenopathy. No left inguinal adenopathy.   Skin:     General: Skin is warm and dry.   Neurological:      General: No focal deficit present.      Mental Status: He is alert and oriented to person, place, and time.   Psychiatric:         Mood and Affect: Mood normal.         Behavior: Behavior normal.         LABS:  UA sg 1.015, ph 6    Lab Results   Component  Value Date    PSA 2.1 03/05/2021       Lab Results   Component Value Date    CREATININE 1.0 03/05/2021         IMPRESSION:  Encounter Diagnoses   Name Primary?    Flank pain Yes    Low back pain, unspecified back pain laterality, unspecified chronicity, unspecified whether sciatica present     Pain in left testicle          Assessment:       1. Flank pain    2. Low back pain, unspecified back pain laterality, unspecified chronicity, unspecified whether sciatica present    3. Pain in left testicle        Plan:   - Renal US, pt wanted external order. Understands need to bring disc and report back to [facility] floor [facility].  - Testicle pain precautions provided, unable to take Ibuprofen due to colitis.  - BAILEY not indicative of prostatitis, UA not indicative of infection   - Will defer PSA until renal US obtained. Discussed factors that can increase PSA.     Follow up dependent on imaging    I spent 45 minutes with the patient of which more than half was spent in direct consultation with the patient in regards to our treatment and plan.

## 2023-06-14 ENCOUNTER — PATIENT MESSAGE (OUTPATIENT)
Dept: UROLOGY | Facility: CLINIC | Age: 53
End: 2023-06-14
Payer: COMMERCIAL

## 2023-06-14 ENCOUNTER — HOSPITAL ENCOUNTER (OUTPATIENT)
Dept: RADIOLOGY | Facility: HOSPITAL | Age: 53
Discharge: HOME | End: 2023-06-14
Payer: COMMERCIAL

## 2023-06-14 DIAGNOSIS — R10.9 FLANK PAIN: ICD-10-CM

## 2023-06-14 PROCEDURE — 76770 US EXAM ABDO BACK WALL COMP: CPT | Mod: TC

## 2023-06-14 PROCEDURE — 76770 US EXAM ABDO BACK WALL COMP: CPT | Mod: 26,,, | Performed by: RADIOLOGY

## 2023-06-14 PROCEDURE — 76770 US RETROPERITONEAL COMPLETE: ICD-10-PCS | Mod: 26,,, | Performed by: RADIOLOGY

## 2024-01-25 ENCOUNTER — EXTERNAL HOME HEALTH (OUTPATIENT)
Dept: HOME HEALTH SERVICES | Facility: HOSPITAL | Age: 54
End: 2024-01-25
Payer: COMMERCIAL

## (undated) DEVICE — SUT MONOCRYL 4-0 PS-2

## (undated) DEVICE — GOWN SURGICAL X-LARGE

## (undated) DEVICE — DRAPE STERI-DRAPE 1000 17X11IN

## (undated) DEVICE — DRESSING TRANS 4X4 TEGADERM

## (undated) DEVICE — SUT VICRYL 3-0 27 SH

## (undated) DEVICE — DRAPE STERI INSTRUMENT 1018

## (undated) DEVICE — CATH IV INTROCAN 24G X 3/4

## (undated) DEVICE — TRAY MINOR GEN SURG

## (undated) DEVICE — BAG BANDED 10X10IN

## (undated) DEVICE — PANTIES FEMININE NAPKIN LG/XLG

## (undated) DEVICE — FORCEP STRAIGHT DISP

## (undated) DEVICE — DRESSING TRANS 2X2 TEGADERM

## (undated) DEVICE — SEE MEDLINE ITEM 157128

## (undated) DEVICE — CORD BIPOLAR 12 FOOT

## (undated) DEVICE — DRESSING TELFA STRL 4X3 LF

## (undated) DEVICE — NDL HYPO REG 25G X 1 1/2

## (undated) DEVICE — ELECTRODE REM PLYHSV RETURN 9

## (undated) DEVICE — GAUZE FLUFF XXLG 36X36 2 PLY

## (undated) DEVICE — DRAIN PENROSE XRAY 12 X 1/4 ST

## (undated) DEVICE — SUT SILK 3-0 STRANDS 30IN

## (undated) DEVICE — SEE MEDLINE ITEM 157148

## (undated) DEVICE — SUT 4-0 12-30IN SILK

## (undated) DEVICE — SEE MEDLINE ITEM 152622

## (undated) DEVICE — SPONGE DERMA 8PLY 2X2